# Patient Record
Sex: MALE | Race: WHITE | NOT HISPANIC OR LATINO | Employment: FULL TIME | URBAN - METROPOLITAN AREA
[De-identification: names, ages, dates, MRNs, and addresses within clinical notes are randomized per-mention and may not be internally consistent; named-entity substitution may affect disease eponyms.]

---

## 2019-01-08 ENCOUNTER — TELEPHONE (OUTPATIENT)
Dept: INTERNAL MEDICINE | Facility: CLINIC | Age: 39
End: 2019-01-08

## 2019-01-08 RX ORDER — LISINOPRIL 40 MG/1
40 TABLET ORAL DAILY
Qty: 30 TABLET | Refills: 1 | Status: SHIPPED | OUTPATIENT
Start: 2019-01-08 | End: 2019-03-12 | Stop reason: SDUPTHER

## 2019-01-14 ENCOUNTER — TELEPHONE (OUTPATIENT)
Dept: INTERNAL MEDICINE | Facility: CLINIC | Age: 39
End: 2019-01-14

## 2019-01-17 ENCOUNTER — TELEPHONE (OUTPATIENT)
Dept: INTERNAL MEDICINE | Facility: CLINIC | Age: 39
End: 2019-01-17

## 2019-01-17 NOTE — TELEPHONE ENCOUNTER
Pt returned call from 1/14 and stated he was going to try and see [facility] for the problem because its too hard to get down here from [place]. Stated if he couldn't get in with them he would return call and make an appt.

## 2019-03-12 RX ORDER — LISINOPRIL 40 MG/1
40 TABLET ORAL DAILY
Qty: 90 TABLET | Refills: 1 | Status: SHIPPED | OUTPATIENT
Start: 2019-03-12 | End: 2019-09-09 | Stop reason: SDUPTHER

## 2019-04-01 PROBLEM — E78.5 HYPERLIPIDEMIA LDL GOAL <100: Status: ACTIVE | Noted: 2019-04-01

## 2019-04-01 PROBLEM — M54.50 LOW BACK PAIN AT MULTIPLE SITES: Status: ACTIVE | Noted: 2019-04-01

## 2019-04-23 PROBLEM — K64.9 HEMORRHOIDS: Status: ACTIVE | Noted: 2019-04-23

## 2019-04-23 PROBLEM — I45.10 INCOMPLETE RIGHT BUNDLE BRANCH BLOCK (RBBB): Status: ACTIVE | Noted: 2019-04-23

## 2019-05-14 ENCOUNTER — OFFICE VISIT (OUTPATIENT)
Dept: INTERNAL MEDICINE | Facility: CLINIC | Age: 39
End: 2019-05-14

## 2019-05-14 ENCOUNTER — LAB REQUISITION (OUTPATIENT)
Dept: LAB | Facility: HOSPITAL | Age: 39
End: 2019-05-14

## 2019-05-14 VITALS
DIASTOLIC BLOOD PRESSURE: 86 MMHG | TEMPERATURE: 97.3 F | SYSTOLIC BLOOD PRESSURE: 110 MMHG | WEIGHT: 225 LBS | HEIGHT: 73 IN | BODY MASS INDEX: 29.82 KG/M2 | HEART RATE: 70 BPM

## 2019-05-14 DIAGNOSIS — Z00.00 PREVENTATIVE HEALTH CARE: ICD-10-CM

## 2019-05-14 DIAGNOSIS — Z23 NEED FOR HEPATITIS A VACCINATION: ICD-10-CM

## 2019-05-14 DIAGNOSIS — I10 ESSENTIAL HYPERTENSION: Primary | ICD-10-CM

## 2019-05-14 DIAGNOSIS — Z00.00 ROUTINE GENERAL MEDICAL EXAMINATION AT A HEALTH CARE FACILITY: ICD-10-CM

## 2019-05-14 DIAGNOSIS — E78.5 HYPERLIPIDEMIA LDL GOAL <100: ICD-10-CM

## 2019-05-14 PROCEDURE — 90471 IMMUNIZATION ADMIN: CPT | Performed by: INTERNAL MEDICINE

## 2019-05-14 PROCEDURE — 36415 COLL VENOUS BLD VENIPUNCTURE: CPT | Performed by: INTERNAL MEDICINE

## 2019-05-14 PROCEDURE — 99395 PREV VISIT EST AGE 18-39: CPT | Performed by: INTERNAL MEDICINE

## 2019-05-14 PROCEDURE — 90632 HEPA VACCINE ADULT IM: CPT | Performed by: INTERNAL MEDICINE

## 2019-05-14 NOTE — PROGRESS NOTES
"Chief Complaint   Patient presents with   • Annual Exam     fasting for labs       History of Present Illness  38 y.o. white male presents for wellness exam. He is doing well with heartburn with rare symptoms less than monthly. He denies chest pain.    Review of Systems   Constitutional: Negative for chills and fever.   HENT: Positive for postnasal drip.    Respiratory: Negative for cough and shortness of breath.    Cardiovascular: Negative for chest pain and palpitations.   Gastrointestinal: Negative for abdominal pain, nausea and vomiting.   Musculoskeletal: Positive for back pain (back pain improved ).   Skin: Negative for rash.   Neurological: Negative for dizziness, light-headedness and headaches.   All other systems reviewed and are negative.     All other ROS reviewed and negative.    PMSFH:  The following portions of the patient's history were reviewed and updated as appropriate: allergies, current medications, past family history, past medical history, past social history, past surgical history and problem list.      Current Outpatient Medications:   •  lisinopril (PRINIVIL,ZESTRIL) 40 MG tablet, Take 1 tablet by mouth Daily., Disp: 90 tablet, Rfl: 1  •  ranitidine (ZANTAC) 150 MG tablet, Take 150 mg by mouth daily., Disp: , Rfl:     VITALS:  /86   Pulse 70   Temp 97.3 °F (36.3 °C)   Ht 185.4 cm (72.99\")   Wt 102 kg (225 lb)   BMI 29.69 kg/m²     Physical Exam   Constitutional: He is oriented to person, place, and time. He appears well-developed and well-nourished.   HENT:   Head: Normocephalic.   Right Ear: External ear normal.   Left Ear: External ear normal.   Mouth/Throat: Oropharynx is clear and moist.   Eyes: Conjunctivae and EOM are normal.   Neck: No JVD present.   Cardiovascular: Normal rate, regular rhythm and normal heart sounds.   Pulmonary/Chest: Effort normal and breath sounds normal. No respiratory distress.   Abdominal: Soft. Bowel sounds are normal. There is no tenderness. "   Genitourinary:   Genitourinary Comments: No hernias   Musculoskeletal: He exhibits no edema.   Lymphadenopathy:     He has no cervical adenopathy.   Neurological: He is alert and oriented to person, place, and time.   Skin: Skin is warm and dry.   Psychiatric: He has a normal mood and affect. His behavior is normal.   Nursing note and vitals reviewed.      LABS:  No results found for this or any previous visit.    Procedures         ASSESSMENT/PLAN:  Problem List Items Addressed This Visit        Cardiovascular and Mediastinum    Essential hypertension - Primary     Hypertension is improving with treatment.  Continue current treatment regimen.  Dietary sodium restriction.  Weight loss.  Regular aerobic exercise.  Blood pressure will be reassessed at the next regular appointment.            Other    Preventative health care     He will get second Hep A vaccine. Age-appropriate Counseling:  Discussed preventative medicine issues with patient including regular exercise, healthy diet, stress reduction, adequate sleep and recommended age-appropriate screening studies.  Immunizations reviewed.                    FOLLOW-UP:  No Follow-up on file.      Electronically signed by:    Yoan Woodard MD

## 2019-05-14 NOTE — ASSESSMENT & PLAN NOTE
He will get second Hep A vaccine. Age-appropriate Counseling:  Discussed preventative medicine issues with patient including regular exercise, healthy diet, stress reduction, adequate sleep and recommended age-appropriate screening studies.  Immunizations reviewed.

## 2019-05-15 LAB
ALBUMIN SERPL-MCNC: 4.4 G/DL (ref 3.5–5.2)
ALBUMIN/CREAT UR: 3.7 MG/G CREAT (ref 0–30)
ALBUMIN/GLOB SERPL: 1.7 G/DL
ALP SERPL-CCNC: 69 U/L (ref 39–117)
ALT SERPL-CCNC: 31 U/L (ref 1–41)
AST SERPL-CCNC: 29 U/L (ref 1–40)
BILIRUB SERPL-MCNC: 0.6 MG/DL (ref 0.2–1.2)
BUN SERPL-MCNC: 10 MG/DL (ref 6–20)
BUN/CREAT SERPL: 10.5 (ref 7–25)
CALCIUM SERPL-MCNC: 10.1 MG/DL (ref 8.6–10.5)
CHLORIDE SERPL-SCNC: 99 MMOL/L (ref 98–107)
CHOLEST SERPL-MCNC: 219 MG/DL (ref 0–200)
CO2 SERPL-SCNC: 27.2 MMOL/L (ref 22–29)
CREAT SERPL-MCNC: 0.95 MG/DL (ref 0.76–1.27)
CREAT UR-MCNC: 113.5 MG/DL
GLOBULIN SER CALC-MCNC: 2.6 GM/DL
GLUCOSE SERPL-MCNC: 94 MG/DL (ref 65–99)
HDLC SERPL-MCNC: 44 MG/DL (ref 40–60)
LDLC SERPL CALC-MCNC: 148 MG/DL (ref 0–100)
MICROALBUMIN UR-MCNC: 4.2 UG/ML
POTASSIUM SERPL-SCNC: 4.2 MMOL/L (ref 3.5–5.2)
PROT SERPL-MCNC: 7 G/DL (ref 6–8.5)
SODIUM SERPL-SCNC: 140 MMOL/L (ref 136–145)
TRIGL SERPL-MCNC: 134 MG/DL (ref 0–150)
TSH SERPL DL<=0.005 MIU/L-ACNC: 1.09 MIU/ML (ref 0.27–4.2)
VLDLC SERPL CALC-MCNC: 26.8 MG/DL

## 2019-09-09 RX ORDER — LISINOPRIL 40 MG/1
40 TABLET ORAL DAILY
Qty: 90 TABLET | Refills: 1 | Status: SHIPPED | OUTPATIENT
Start: 2019-09-09 | End: 2020-01-17 | Stop reason: SDUPTHER

## 2020-01-17 RX ORDER — LISINOPRIL 40 MG/1
40 TABLET ORAL DAILY
Qty: 90 TABLET | Refills: 0 | Status: SHIPPED | OUTPATIENT
Start: 2020-01-17 | End: 2020-05-18

## 2020-05-18 RX ORDER — LISINOPRIL 40 MG/1
TABLET ORAL
Qty: 30 TABLET | Refills: 0 | Status: SHIPPED | OUTPATIENT
Start: 2020-05-18 | End: 2020-06-19

## 2020-05-26 ENCOUNTER — OFFICE VISIT (OUTPATIENT)
Dept: INTERNAL MEDICINE | Facility: CLINIC | Age: 40
End: 2020-05-26

## 2020-05-26 ENCOUNTER — LAB (OUTPATIENT)
Dept: LAB | Facility: HOSPITAL | Age: 40
End: 2020-05-26

## 2020-05-26 VITALS
DIASTOLIC BLOOD PRESSURE: 84 MMHG | TEMPERATURE: 98 F | HEIGHT: 73 IN | HEART RATE: 66 BPM | SYSTOLIC BLOOD PRESSURE: 100 MMHG | BODY MASS INDEX: 30.19 KG/M2 | OXYGEN SATURATION: 99 % | WEIGHT: 227.8 LBS

## 2020-05-26 DIAGNOSIS — E78.5 HYPERLIPIDEMIA LDL GOAL <100: ICD-10-CM

## 2020-05-26 DIAGNOSIS — M54.50 LOW BACK PAIN AT MULTIPLE SITES: ICD-10-CM

## 2020-05-26 DIAGNOSIS — I10 ESSENTIAL HYPERTENSION: ICD-10-CM

## 2020-05-26 DIAGNOSIS — Z00.00 PREVENTATIVE HEALTH CARE: Primary | ICD-10-CM

## 2020-05-26 DIAGNOSIS — M25.512 ACUTE PAIN OF LEFT SHOULDER: ICD-10-CM

## 2020-05-26 DIAGNOSIS — Z20.5 EXPOSURE TO HEPATITIS C: ICD-10-CM

## 2020-05-26 LAB
ALBUMIN SERPL-MCNC: 4.9 G/DL (ref 3.5–5.2)
ALBUMIN UR-MCNC: <1.2 MG/DL
ALBUMIN/GLOB SERPL: 2.2 G/DL
ALP SERPL-CCNC: 52 U/L (ref 39–117)
ALT SERPL W P-5'-P-CCNC: 33 U/L (ref 1–41)
ANION GAP SERPL CALCULATED.3IONS-SCNC: 12.2 MMOL/L (ref 5–15)
AST SERPL-CCNC: 61 U/L (ref 1–40)
BASOPHILS # BLD AUTO: 0.03 10*3/MM3 (ref 0–0.2)
BASOPHILS NFR BLD AUTO: 0.7 % (ref 0–1.5)
BILIRUB SERPL-MCNC: 0.7 MG/DL (ref 0.2–1.2)
BUN BLD-MCNC: 11 MG/DL (ref 6–20)
BUN/CREAT SERPL: 12.5 (ref 7–25)
CALCIUM SPEC-SCNC: 9.4 MG/DL (ref 8.6–10.5)
CHLORIDE SERPL-SCNC: 101 MMOL/L (ref 98–107)
CHOLEST SERPL-MCNC: 202 MG/DL (ref 0–200)
CO2 SERPL-SCNC: 25.8 MMOL/L (ref 22–29)
CREAT BLD-MCNC: 0.88 MG/DL (ref 0.76–1.27)
CREAT UR-MCNC: 67.6 MG/DL
CRP SERPL-MCNC: 0.14 MG/DL (ref 0.01–0.5)
DEPRECATED RDW RBC AUTO: 38.5 FL (ref 37–54)
EOSINOPHIL # BLD AUTO: 0.27 10*3/MM3 (ref 0–0.4)
EOSINOPHIL NFR BLD AUTO: 6.3 % (ref 0.3–6.2)
ERYTHROCYTE [DISTWIDTH] IN BLOOD BY AUTOMATED COUNT: 11.9 % (ref 12.3–15.4)
GFR SERPL CREATININE-BSD FRML MDRD: 97 ML/MIN/1.73
GLOBULIN UR ELPH-MCNC: 2.2 GM/DL
GLUCOSE BLD-MCNC: 99 MG/DL (ref 65–99)
HCT VFR BLD AUTO: 44.1 % (ref 37.5–51)
HCV AB SER DONR QL: NORMAL
HDLC SERPL-MCNC: 43 MG/DL (ref 40–60)
HGB BLD-MCNC: 15.1 G/DL (ref 13–17.7)
IMM GRANULOCYTES # BLD AUTO: 0.02 10*3/MM3 (ref 0–0.05)
IMM GRANULOCYTES NFR BLD AUTO: 0.5 % (ref 0–0.5)
LDLC SERPL CALC-MCNC: 132 MG/DL (ref 0–100)
LDLC/HDLC SERPL: 3.07 {RATIO}
LYMPHOCYTES # BLD AUTO: 1.5 10*3/MM3 (ref 0.7–3.1)
LYMPHOCYTES NFR BLD AUTO: 35 % (ref 19.6–45.3)
MCH RBC QN AUTO: 30.1 PG (ref 26.6–33)
MCHC RBC AUTO-ENTMCNC: 34.2 G/DL (ref 31.5–35.7)
MCV RBC AUTO: 88 FL (ref 79–97)
MICROALBUMIN/CREAT UR: NORMAL MG/G{CREAT}
MONOCYTES # BLD AUTO: 0.32 10*3/MM3 (ref 0.1–0.9)
MONOCYTES NFR BLD AUTO: 7.5 % (ref 5–12)
NEUTROPHILS # BLD AUTO: 2.14 10*3/MM3 (ref 1.7–7)
NEUTROPHILS NFR BLD AUTO: 50 % (ref 42.7–76)
NRBC BLD AUTO-RTO: 0 /100 WBC (ref 0–0.2)
PLATELET # BLD AUTO: 183 10*3/MM3 (ref 140–450)
PMV BLD AUTO: 11.3 FL (ref 6–12)
POTASSIUM BLD-SCNC: 4.3 MMOL/L (ref 3.5–5.2)
PROT SERPL-MCNC: 7.1 G/DL (ref 6–8.5)
RBC # BLD AUTO: 5.01 10*6/MM3 (ref 4.14–5.8)
SODIUM BLD-SCNC: 139 MMOL/L (ref 136–145)
TRIGL SERPL-MCNC: 136 MG/DL (ref 0–150)
TSH SERPL DL<=0.05 MIU/L-ACNC: 1.59 UIU/ML (ref 0.27–4.2)
VLDLC SERPL-MCNC: 27.2 MG/DL (ref 5–40)
WBC NRBC COR # BLD: 4.28 10*3/MM3 (ref 3.4–10.8)

## 2020-05-26 PROCEDURE — 82570 ASSAY OF URINE CREATININE: CPT

## 2020-05-26 PROCEDURE — 80053 COMPREHEN METABOLIC PANEL: CPT

## 2020-05-26 PROCEDURE — 86141 C-REACTIVE PROTEIN HS: CPT

## 2020-05-26 PROCEDURE — 82043 UR ALBUMIN QUANTITATIVE: CPT

## 2020-05-26 PROCEDURE — 85025 COMPLETE CBC W/AUTO DIFF WBC: CPT

## 2020-05-26 PROCEDURE — 86803 HEPATITIS C AB TEST: CPT

## 2020-05-26 PROCEDURE — 99395 PREV VISIT EST AGE 18-39: CPT | Performed by: INTERNAL MEDICINE

## 2020-05-26 PROCEDURE — 84443 ASSAY THYROID STIM HORMONE: CPT

## 2020-05-26 PROCEDURE — 80061 LIPID PANEL: CPT

## 2020-05-26 PROCEDURE — 93000 ELECTROCARDIOGRAM COMPLETE: CPT | Performed by: INTERNAL MEDICINE

## 2020-05-26 RX ORDER — FAMOTIDINE 20 MG/1
20 TABLET, FILM COATED ORAL DAILY
COMMUNITY

## 2020-05-26 RX ORDER — TIZANIDINE HYDROCHLORIDE 4 MG/1
4 CAPSULE, GELATIN COATED ORAL NIGHTLY PRN
Qty: 30 CAPSULE | Refills: 1 | Status: SHIPPED | OUTPATIENT
Start: 2020-05-26 | End: 2022-06-28

## 2020-05-26 NOTE — ASSESSMENT & PLAN NOTE
Hypertension is improving with treatment.  Continue current treatment regimen.  Regular aerobic exercise.  Blood pressure will be reassessed at the next regular appointment.

## 2020-05-26 NOTE — PROGRESS NOTES
"Chief Complaint   Patient presents with   • Hypertension     physical   • Pain     joints/muscular L shoulder , lower back and both knees       History of Present Illness  39 y.o. white male presents for wellness exam. Overall health good except some joint and muscle aches. He has some intermittent low back pain and left shoulder pain and left thigh pain.     Review of Systems   Constitutional: Negative for chills, fatigue and fever.   HENT: Negative for congestion, ear pain, sinus pressure and sore throat.    Respiratory: Negative for cough, chest tightness, shortness of breath and wheezing.    Cardiovascular: Negative for chest pain and palpitations.   Gastrointestinal: Negative for abdominal pain, blood in stool and constipation.   Musculoskeletal: Positive for arthralgias, back pain, joint swelling and myalgias.   Skin: Negative for color change.   Allergic/Immunologic: Negative for environmental allergies.   Neurological: Negative for dizziness, speech difficulty and headaches.   Psychiatric/Behavioral: Positive for sleep disturbance. Negative for confusion and dysphoric mood. The patient is not nervous/anxious.      All other ROS reviewed and negative.    PMSFH:  The following portions of the patient's history were reviewed and updated as appropriate: allergies, current medications, past family history, past medical history, past social history, past surgical history and problem list.      Current Outpatient Medications:   •  famotidine (PEPCID) 20 MG tablet, Take 20 mg by mouth Daily., Disp: , Rfl:   •  lisinopril (PRINIVIL,ZESTRIL) 40 MG tablet, TAKE ONE TABLET BY MOUTH DAILY, Disp: 30 tablet, Rfl: 0  •  TiZANidine (ZANAFLEX) 4 MG capsule, Take 1 capsule by mouth At Night As Needed for Muscle Spasms (back pain)., Disp: 30 capsule, Rfl: 1    VITALS:  /84 (BP Location: Left arm, Patient Position: Sitting, Cuff Size: Adult)   Pulse 66   Temp 98 °F (36.7 °C) (Temporal)   Ht 185.4 cm (73\") Comment: per " patient  Wt 103 kg (227 lb 12.8 oz)   SpO2 99%   BMI 30.05 kg/m²     Physical Exam   Constitutional: He is oriented to person, place, and time. He appears well-developed and well-nourished.   HENT:   Head: Normocephalic.   Right Ear: External ear normal.   Left Ear: External ear normal.   Eyes: Conjunctivae and EOM are normal.   Neck: No JVD present.   Cardiovascular: Normal rate and normal heart sounds.   bradycardia   Pulmonary/Chest: Effort normal and breath sounds normal. No respiratory distress.   Abdominal: Soft. Bowel sounds are normal. There is no tenderness.   Musculoskeletal: He exhibits tenderness (left shoulder pain).   Lymphadenopathy:     He has no cervical adenopathy.   Neurological: He is alert and oriented to person, place, and time.   Skin: Skin is warm and dry.   Psychiatric: He has a normal mood and affect. His behavior is normal.   Nursing note and vitals reviewed.      LABS:  Results for orders placed or performed in visit on 05/14/19   Microalbumin / Creatinine Urine Ratio -   Result Value Ref Range    Creatinine, Urine 113.5 Not Estab. mg/dL    Microalbumin, Urine 4.2 Not Estab. ug/mL    Microalbumin/Creatinine Ratio 3.7 0.0 - 30.0 mg/g creat   Comprehensive Metabolic Panel   Result Value Ref Range    Glucose 94 65 - 99 mg/dL    BUN 10 6 - 20 mg/dL    Creatinine 0.95 0.76 - 1.27 mg/dL    eGFR Non African Am 89 >60 mL/min/1.73    eGFR African Am 108 >60 mL/min/1.73    BUN/Creatinine Ratio 10.5 7.0 - 25.0    Sodium 140 136 - 145 mmol/L    Potassium 4.2 3.5 - 5.2 mmol/L    Chloride 99 98 - 107 mmol/L    Total CO2 27.2 22.0 - 29.0 mmol/L    Calcium 10.1 8.6 - 10.5 mg/dL    Total Protein 7.0 6.0 - 8.5 g/dL    Albumin 4.40 3.50 - 5.20 g/dL    Globulin 2.6 gm/dL    A/G Ratio 1.7 g/dL    Total Bilirubin 0.6 0.2 - 1.2 mg/dL    Alkaline Phosphatase 69 39 - 117 U/L    AST (SGOT) 29 1 - 40 U/L    ALT (SGPT) 31 1 - 41 U/L   Lipid Panel   Result Value Ref Range    Total Cholesterol 219 (H) 0 - 200 mg/dL     Triglycerides 134 0 - 150 mg/dL    HDL Cholesterol 44 40 - 60 mg/dL    VLDL Cholesterol 26.8 mg/dL    LDL Cholesterol  148 (H) 0 - 100 mg/dL   TSH Rfx On Abnormal To Free T4   Result Value Ref Range    TSH 1.090 0.270 - 4.200 mIU/mL         ECG 12 Lead  Date/Time: 5/26/2020 11:33 AM  Performed by: Yoan Woodard MD  Authorized by: Yoan Woodard MD   Comparison: compared with previous ECG from 9/16/2016  Similar to previous ECG  Rhythm: sinus bradycardia  Rate: bradycardic  Conduction: incomplete right bundle branch block  QRS axis: normal  Comments: Overall ok EKG                 ASSESSMENT/PLAN:  Problem List Items Addressed This Visit        Cardiovascular and Mediastinum    Essential hypertension     Hypertension is improving with treatment.  Continue current treatment regimen.  Regular aerobic exercise.  Blood pressure will be reassessed at the next regular appointment.         Relevant Orders    Microalbumin / Creatinine Urine Ratio - Urine, Clean Catch    Hyperlipidemia LDL goal <100     Lipid abnormalities are improving with lifestyle modifications.  Nutritional counseling was provided.  Lipids will be reassessed in 1 year.         Relevant Orders    Comprehensive Metabolic Panel    Lipid Panel    TSH Rfx On Abnormal To Free T4    High Sensitivity CRP    CBC & Differential       Nervous and Auditory    Low back pain at multiple sites     Worsening and some left thigh pain. See Dr Trujillo.          Relevant Medications    TiZANidine (ZANAFLEX) 4 MG capsule    Other Relevant Orders    Ambulatory Referral to Physical Medicine Rehab       Other    Preventative health care - Primary     His mood is good and overall good with medications. Check labs. Age-appropriate Counseling:  Discussed preventative medicine issues with patient including regular exercise, healthy diet, stress reduction, adequate sleep and recommended age-appropriate screening studies.  Immunizations reviewed.                Other Visit  Diagnoses     Acute pain of left shoulder        See Dr Trujillo.     Relevant Orders    Ambulatory Referral to Physical Medicine Rehab    Exposure to hepatitis C        Relevant Orders    Hepatitis C Antibody          FOLLOW-UP:  Return in about 1 year (around 5/26/2021) for Labs this visit, Annual physical.      Electronically signed by:    Yoan Woodard MD

## 2020-05-26 NOTE — ASSESSMENT & PLAN NOTE
Lipid abnormalities are improving with lifestyle modifications.  Nutritional counseling was provided.  Lipids will be reassessed in 1 year.

## 2020-05-26 NOTE — ASSESSMENT & PLAN NOTE
His mood is good and overall good with medications. Check labs. Age-appropriate Counseling:  Discussed preventative medicine issues with patient including regular exercise, healthy diet, stress reduction, adequate sleep and recommended age-appropriate screening studies.  Immunizations reviewed.

## 2020-06-19 RX ORDER — LISINOPRIL 40 MG/1
TABLET ORAL
Qty: 30 TABLET | Refills: 0 | Status: SHIPPED | OUTPATIENT
Start: 2020-06-19 | End: 2020-08-17

## 2020-08-17 RX ORDER — LISINOPRIL 40 MG/1
TABLET ORAL
Qty: 90 TABLET | Refills: 1 | Status: SHIPPED | OUTPATIENT
Start: 2020-08-17 | End: 2021-02-11 | Stop reason: SDUPTHER

## 2020-08-21 ENCOUNTER — PATIENT MESSAGE (OUTPATIENT)
Dept: INTERNAL MEDICINE | Facility: CLINIC | Age: 40
End: 2020-08-21

## 2020-08-21 NOTE — TELEPHONE ENCOUNTER
From: Maldonado Escobedo  To: Yoan Woodard MD  Sent: 8/21/2020 12:42 PM EDT  Subject: Prescription Question    Could I please have more than one refill of the Lisinopril!! I've been in recently and the medication dosage gas not changed. Thank you

## 2020-09-01 ENCOUNTER — TREATMENT (OUTPATIENT)
Dept: PHYSICAL THERAPY | Facility: CLINIC | Age: 40
End: 2020-09-01

## 2020-09-01 DIAGNOSIS — G89.29 CHRONIC LOW BACK PAIN, UNSPECIFIED BACK PAIN LATERALITY, UNSPECIFIED WHETHER SCIATICA PRESENT: ICD-10-CM

## 2020-09-01 DIAGNOSIS — M25.559 PAIN, HIP: ICD-10-CM

## 2020-09-01 DIAGNOSIS — M54.2 CERVICAL PAIN: Primary | ICD-10-CM

## 2020-09-01 DIAGNOSIS — M54.50 CHRONIC LOW BACK PAIN, UNSPECIFIED BACK PAIN LATERALITY, UNSPECIFIED WHETHER SCIATICA PRESENT: ICD-10-CM

## 2020-09-01 PROCEDURE — 97110 THERAPEUTIC EXERCISES: CPT | Performed by: PHYSICAL THERAPIST

## 2020-09-01 PROCEDURE — 97162 PT EVAL MOD COMPLEX 30 MIN: CPT | Performed by: PHYSICAL THERAPIST

## 2020-09-01 NOTE — PROGRESS NOTES
Physical Therapy Initial Evaluation and Plan of Care    Patient: Maldonado Escobedo   : 1980  Diagnosis/ICD-10 Code:  Cervical pain [M54.2]  Referring practitioner: Alejandra Trujillo MD  Date of Initial Visit: 2020  Today's Date: 2020  Patient seen for 1 sessions           Subjective Questionnaire: Oswestry:  = 14 % disability      Subjective Evaluation    History of Present Illness  Mechanism of injury: Patient reports no apparent reason for neck and LBP, work catching up with him.    right now Neck Pain 2/10. LBP right side when shifting in seat 2/10. At rest 0/10 LBP but does feel it in neck and shoulders. At night when sleeping on Left side and elbow bent left hand goes numb. Rotates a lot when he sleeps. Some nights better than others.   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  BP worse during standing, during long surgeries.  At rest only R side LBP, but after long standing up to 7 or 8/10.  Hunching over doing sx.  Not running anymore. But still walking  And sometimes running short distances to get somewhere.  What helps the most is putting a heating pad on it every night. Flat on back under back and neck.   Sometimes walking longer has ref. R hip to anterior thigh pain. Occasionally it does go down the shin. Has not done that for about 6 months.    Subjective comment: 38 y/o w/m c/o neck and LBP for 1 to 2 years. Seeking [facility] per MD recomendation.   Patient Occupation: [employer], looking down, surgeries, charts Pain  Current pain ratin (base of neck and going into shoulder blades. )  At best pain ratin (back pain can be 0/10, neck always at least 2/10. )  At worst pain ratin (at end of day after long standing doing surgeries. )  Relieving factors: heat and rest (lying flat on back )  Aggravating factors: standing (standing sitting huched over)  Progression: worsening    Hand dominance: right    Patient Goals  Patient goals for  therapy: decreased pain, increased motion, increased strength, independence with ADLs/IADLs and return to sport/leisure activities  Patient goal: Be able to go to work and not hurt at the end of the day, learn to self manage.            Objective          Postural Observations  Seated posture: fair  Standing posture: fair  Correction of posture: makes symptoms better (sitting with lumbar roll)        Active Range of Motion   Cervical/Thoracic Spine   Cervical  Subcranial protraction: with pain   Subcranial retraction: WFL   Flexion: 28 (worse base of neck pain during) degrees with pain  Extension: 80 (no effect) degrees   Left lateral flexion: 30 (no effect) degrees   Right lateral flexion: 35 (no effect) degrees   Left rotation: 80 (no effect) degrees   Right rotation: 62 (worse R side of neck into shoulder pain during) degrees with pain    Thoracic   Extension: 80 (no effect) degrees     Lumbar   Flexion: 80 (catch group home down and back up) degrees with pain  Extension: 35 (better during, back to before after. ) degrees with pain  Left lateral flexion: 28 (feel more like a stretch in R LB during) degrees   Right lateral flexion: 27 (worse R LBP during) degrees with pain    Additional Active Range of Motion Details  MMT both UE and LE 5/5   Shoulders AROM WNL but pop in left shoulder at end ROM flexion.  Normal sensation both UE/LE  SLR (-)  Improved AROM standing flexion after repeat EIS, less LBP during standing flexion and less pain during side glides after.  Able to tolerate PPU, LBP better after.  After repeat neck retractions improved neck rotation with less pain to the R.  Able to tolerate progression to retraction ext/rot  Do these for HEP every 2 hours.  Patient downloaded [employer] marcell.             Assessment & Plan     Assessment  Impairments: abnormal or restricted ROM, activity intolerance, lacks appropriate home exercise program and pain with function  Assessment details: 40 y/o w/m c/o chronis neck  and LBP with ref. R leg pain getting worse. At eval responding to posture correction and neck retractions and lumbar extensions. Patient will benefit from skilled PT.   Barriers to therapy: Hx HTN  Prognosis: good  Functional Limitations: lifting, sleeping, walking, pulling, uncomfortable because of pain, moving in bed, sitting, standing and stooping  Goals  Plan Goals: Pt presents to PT with symptoms consistent with chronic neck and LBP. Pt would benefit from skilled PT intervention to address the deficits noted.      SHORT TERM GOALS: Time for goal achievement:  3 weeks  1. Pt will be able to demonstrate initial HEP.  2. Pt reports  Neck &LBP at least 50% better.  3. Pt can demo safe body mechanics.  4. Pt can tolerate core strength ex.    LONG TERM GOALS: Time for goal achievement: 3 months  1. Pt to score significantly less on Modified Oswestry score.  2. Pt reports he is ready for DC to [facility] for self management of his condition.   3. Pt has full pain free AROM lumbar in standing.   4. Patient has full pain free neck AROM.     Plan  Therapy options: will be seen for skilled physical therapy services  Planned modality interventions: cryotherapy, electrical stimulation/Russian stimulation, TENS, thermotherapy (hydrocollator packs), traction and ultrasound  Planned therapy interventions: abdominal trunk stabilization, body mechanics training, gait training, home exercise program, manual therapy, neuromuscular re-education, soft tissue mobilization, spinal/joint mobilization, strengthening, stretching and therapeutic activities  Frequency: 1x week  Duration in visits: 10  Duration in weeks: 10  Treatment plan discussed with: patient        Timed:         Manual Therapy:         mins  33122;     Therapeutic Exercise:    15     mins  32405;     Neuromuscular Lois:        mins  90991;    Therapeutic Activity:          mins  63670;     Gait Training:           mins  59927;     Ultrasound:          mins   64724;    Ionto                                   mins   81372  Self Care                            mins   64096  Aquatic                               mins 18923      Un-Timed:  Electrical Stimulation:         mins  75177 ( );  Dry Needling          mins self-pay  Traction          mins 30166  Low Eval          Mins  33434  Mod Eval     30     Mins  03445  High Eval                            Mins  89582  Re-Eval                               mins  44753        Timed Treatment:   15   mins   Total Treatment:     45   mins    PT SIGNATURE: Yasmin Whitney, PT   DATE TREATMENT INITIATED: 9/1/2020    Initial Certification  Certification Period: 11/30/2020  I certify that the therapy services are furnished while this patient is under my care.  The services outlined above are required by this patient, and will be reviewed every 90 days.     PHYSICIAN: Alejandra Trujillo MD      DATE:     Please sign and return via fax to [phone].. Thank you, [facility].

## 2020-09-08 ENCOUNTER — TREATMENT (OUTPATIENT)
Dept: PHYSICAL THERAPY | Facility: CLINIC | Age: 40
End: 2020-09-08

## 2020-09-08 DIAGNOSIS — M54.50 CHRONIC LOW BACK PAIN, UNSPECIFIED BACK PAIN LATERALITY, UNSPECIFIED WHETHER SCIATICA PRESENT: ICD-10-CM

## 2020-09-08 DIAGNOSIS — M54.2 CERVICAL PAIN: Primary | ICD-10-CM

## 2020-09-08 DIAGNOSIS — G89.29 CHRONIC LOW BACK PAIN, UNSPECIFIED BACK PAIN LATERALITY, UNSPECIFIED WHETHER SCIATICA PRESENT: ICD-10-CM

## 2020-09-08 PROCEDURE — 97110 THERAPEUTIC EXERCISES: CPT | Performed by: PHYSICAL THERAPIST

## 2020-09-08 PROCEDURE — 97140 MANUAL THERAPY 1/> REGIONS: CPT | Performed by: PHYSICAL THERAPIST

## 2020-09-08 PROCEDURE — 97035 APP MDLTY 1+ULTRASOUND EA 15: CPT | Performed by: PHYSICAL THERAPIST

## 2020-09-08 NOTE — PROGRESS NOTES
Physical Therapy Daily Progress Note  VISIT#: 2 POC 10    Subjective   Maldonado Escobedo reports: has been sitting better with lumbar roll, has been doing neck retractions. Does better during the day but still waking up with 7/10 left neck UT pain. No LBP now or this weekend. Has been trying to sleep with different pillows, not successful. Not able to sleep on his back.    Objective     See Exercise, Manual, and Modality Logs for complete treatment.     Patient Education: practiced HEP.   Practiced: neck retractions with self OP.  Improved AROM rotation and extension after repeat retractions and ext/rot's. No more pain in mid range, still pain at end ROM up to 6/10.     Adding Thoracic extensions to HEP.  Still pain in one little spot during end ROM PPU. Stretch feels good.  Progressed to PPU with SAG.      Assessment/Plan    No LBP at rest, only catch at end ROM extension during PPU.   Still Left side neck pain after session but better, only 6/10 at end ROM rotation and extension.   Progress per Plan of Care and Progress strengthening /stabilization /functional activity            Timed:         Manual Therapy:    10     mins  18227;     Therapeutic Exercise:    25     mins  97220;     Neuromuscular Lois:        mins  27589;    Therapeutic Activity:          mins  17001;     Gait Training:           mins  32048;     Ultrasound:     10     mins  60193;    Ionto                                   mins   17529  Self Care                            mins   65866  Canalith Repos                   mins  4209  Aquatic                               mins 05729    Un-Timed:  Electrical Stimulation:         mins  21348 ( );  Dry Needling          mins self-pay  Traction          mins 23611    Timed Treatment:   45   mins   Total Treatment:     45   mins    Yasmin Whitney PT  [place] License # [id number]  Physical Therapist

## 2020-09-15 ENCOUNTER — TREATMENT (OUTPATIENT)
Dept: PHYSICAL THERAPY | Facility: CLINIC | Age: 40
End: 2020-09-15

## 2020-09-15 DIAGNOSIS — G89.29 CHRONIC LOW BACK PAIN, UNSPECIFIED BACK PAIN LATERALITY, UNSPECIFIED WHETHER SCIATICA PRESENT: ICD-10-CM

## 2020-09-15 DIAGNOSIS — M54.2 CERVICAL PAIN: Primary | ICD-10-CM

## 2020-09-15 DIAGNOSIS — M54.50 CHRONIC LOW BACK PAIN, UNSPECIFIED BACK PAIN LATERALITY, UNSPECIFIED WHETHER SCIATICA PRESENT: ICD-10-CM

## 2020-09-15 PROCEDURE — 97110 THERAPEUTIC EXERCISES: CPT | Performed by: PHYSICAL THERAPIST

## 2020-09-15 PROCEDURE — 97140 MANUAL THERAPY 1/> REGIONS: CPT | Performed by: PHYSICAL THERAPIST

## 2020-09-15 NOTE — PROGRESS NOTES
Physical Therapy Daily Progress Note  VISIT#: 3 POC 10    Subjective     Maldonado Escobedo reports: overall 30% better. Right now 5/10 Left neck pain and no LBP. Monday for first time woke up with out neck pain. Went walking 2 mile this am did not heave LBP of Anterior R hip pain. Stopped using Temperpedic pillow. Now down to one smaller pillow.   Objective       See Exercise, Manual, and Modality Logs for complete treatment.     Patient Education: Discussed/practiced back and side sleeping with pillows staggered and towel roll in base of pillow. Discussed trying body pillow for other arm for side sleeping. Practiced HEP.   Practiced: neck retractions with self OP.  Improved neck  AROM rotation and extension after repeat retractions and ext/rot's. Levator stretch and STM, Occiput release. Left UT pain down to 2/10  Adding Levator stretches to HEP.    Assessment/Plan  Came in with no LBP, left with less Left neck pain. L SCM much more tight than R.  Had 2 releases with thoracic mobs, still increased thoracic kyphosis,       Progress per Plan of Care and Progress strengthening /stabilization /functional activity            Timed:         Manual Therapy:    15     mins  30317;     Therapeutic Exercise:    30     mins  75630;     Neuromuscular Lois:        mins  86222;    Therapeutic Activity:          mins  84837;     Gait Training:           mins  67336;     Ultrasound:          mins  01921;    Ionto                                   mins   22562  Self Care                            mins   68582  Canalith Repos                   mins  4209  Aquatic                               mins 05034    Un-Timed:  Electrical Stimulation:         mins  62530 ( );  Dry Needling          mins self-pay  Traction          mins 60315    Timed Treatment:   45   mins   Total Treatment:     45   mins    Yasmin Whitney PT  [place] License # [id number]  Physical Therapist

## 2020-10-06 ENCOUNTER — TREATMENT (OUTPATIENT)
Dept: PHYSICAL THERAPY | Facility: CLINIC | Age: 40
End: 2020-10-06

## 2020-10-06 DIAGNOSIS — M54.2 CERVICAL PAIN: Primary | ICD-10-CM

## 2020-10-06 DIAGNOSIS — M54.50 CHRONIC LOW BACK PAIN, UNSPECIFIED BACK PAIN LATERALITY, UNSPECIFIED WHETHER SCIATICA PRESENT: ICD-10-CM

## 2020-10-06 DIAGNOSIS — G89.29 CHRONIC LOW BACK PAIN, UNSPECIFIED BACK PAIN LATERALITY, UNSPECIFIED WHETHER SCIATICA PRESENT: ICD-10-CM

## 2020-10-06 PROCEDURE — 97110 THERAPEUTIC EXERCISES: CPT | Performed by: PHYSICAL THERAPIST

## 2020-10-06 NOTE — PROGRESS NOTES
Physical Therapy Daily Progress Note  VISIT#: 4 POC 10    Subjective     Maldonado Escobedo reports: overall better, less often neck and LBP. Sleeping better. Made adjustment where he can. Feeling more tightness up higher in [place]iput recently. Only R hip hurting when going for a walk after work now, No longer LBP. Still LBP when standing longer at work.   Objective       See Exercise, Manual, and Modality Logs for complete treatment.     Patient Education:  Practiced: neck retractions with self OP.  Improved neck  AROM rotation to end ROM.      Assessment/Plan  Came in with no LBP, left with less Left neck pain, after neck ex, upper core strength with TB and stretches.    SHORT TERM GOALS: Time for goal achievement:  3 weeks  1. Pt will be able to demonstrate initial HEP - met  2. Pt reports  Neck &LBP at least 50% better - making progress  3. Pt can demo safe body mechanics.  4. Pt can tolerate core strength ex. - met    LONG TERM GOALS: Time for goal achievement: 3 months  1. Pt to score significantly less on Modified Oswestry score.  2. Pt reports he is ready for DC to [facility] for self management of his condition.   3. Pt has full pain free AROM lumbar in standing - making progress  4. Patient has full pain free neck AROM - making progress    Progress per Plan of Care and Progress strengthening /stabilization /functional activity            Timed:         Manual Therapy:         mins  88691;     Therapeutic Exercise:    40     mins  74554;     Neuromuscular Lois:        mins  60389;    Therapeutic Activity:          mins  82298;     Gait Training:           mins  35546;     Ultrasound:          mins  19970;    Ionto                                   mins   19370  Self Care                            mins   15797  Canalith Repos                   mins  4209  Aquatic                               mins 10840    Un-Timed:  Electrical Stimulation:         mins  57797 ( );  Dry Needling          mins  self-pay  Traction          mins 86905    Timed Treatment:   40   mins   Total Treatment:     40   mins    Yasmin Whitney, PT  [place] License # [id number]  Physical Therapist

## 2020-10-13 ENCOUNTER — TREATMENT (OUTPATIENT)
Dept: PHYSICAL THERAPY | Facility: CLINIC | Age: 40
End: 2020-10-13

## 2020-10-13 DIAGNOSIS — M25.559 PAIN, HIP: ICD-10-CM

## 2020-10-13 DIAGNOSIS — M54.2 CERVICAL PAIN: Primary | ICD-10-CM

## 2020-10-13 DIAGNOSIS — M54.50 CHRONIC LOW BACK PAIN, UNSPECIFIED BACK PAIN LATERALITY, UNSPECIFIED WHETHER SCIATICA PRESENT: ICD-10-CM

## 2020-10-13 DIAGNOSIS — G89.29 CHRONIC LOW BACK PAIN, UNSPECIFIED BACK PAIN LATERALITY, UNSPECIFIED WHETHER SCIATICA PRESENT: ICD-10-CM

## 2020-10-13 PROCEDURE — 97110 THERAPEUTIC EXERCISES: CPT | Performed by: PHYSICAL THERAPIST

## 2020-10-13 NOTE — PROGRESS NOTES
Physical Therapy Daily Progress Note  VISIT#: 5 POC 10    Subjective     Maldonado Escobedo reports: overall better,  Sleeping better. Mainly tightness after doing TB ex in neck. Only LBP when sleeping funny.        Objective       See Exercise, Manual, and Modality Logs for complete treatment.     Patient Education:  Practiced: neck retractions with self OP, also R 45 rotation for lingering tension in R base of neck /UT.  Full neck  AROM rotation to end ROM.      Assessment/Plan  Came in with no LBP, left with less Right neck strain, after neck ex, upper core strength with TB and stretches.  Added lumbar flex/LTR and Piriformis stretches to HEP.    SHORT TERM GOALS: Time for goal achievement:  3 weeks  1. Pt will be able to demonstrate initial HEP - met  2. Pt reports  Neck &LBP at least 50% better - making progress  3. Pt can demo safe body mechanics.  4. Pt can tolerate core strength ex. - met    LONG TERM GOALS: Time for goal achievement: 3 months  1. Pt to score significantly less on Modified Oswestry score.  2. Pt reports he is ready for DC to [facility] for self management of his condition.   3. Pt has full pain free AROM lumbar in standing - making progress  4. Patient has full pain free neck AROM - making progress    Progress per Plan of Care and Progress strengthening /stabilization /functional activity            Timed:         Manual Therapy:         mins  91892;     Therapeutic Exercise:    45     mins  88322;     Neuromuscular Lois:        mins  07245;    Therapeutic Activity:          mins  40488;     Gait Training:           mins  00987;     Ultrasound:          mins  32791;    Ionto                                   mins   94931  Self Care                            mins   07724  Canalith Repos                   mins  4209  Aquatic                               mins 76078    Un-Timed:  Electrical Stimulation:         mins  65723 ( );  Dry Needling          mins self-pay  Traction           mins 12828    Timed Treatment:   45   mins   Total Treatment:     45   mins    Yasmin Whitney PT  [place] License # [id number]  Physical Therapist

## 2020-10-20 ENCOUNTER — TREATMENT (OUTPATIENT)
Dept: PHYSICAL THERAPY | Facility: CLINIC | Age: 40
End: 2020-10-20

## 2020-10-20 DIAGNOSIS — M54.50 CHRONIC LOW BACK PAIN, UNSPECIFIED BACK PAIN LATERALITY, UNSPECIFIED WHETHER SCIATICA PRESENT: ICD-10-CM

## 2020-10-20 DIAGNOSIS — M54.2 CERVICAL PAIN: Primary | ICD-10-CM

## 2020-10-20 DIAGNOSIS — M25.559 PAIN, HIP: ICD-10-CM

## 2020-10-20 DIAGNOSIS — G89.29 CHRONIC LOW BACK PAIN, UNSPECIFIED BACK PAIN LATERALITY, UNSPECIFIED WHETHER SCIATICA PRESENT: ICD-10-CM

## 2020-10-20 PROCEDURE — 97110 THERAPEUTIC EXERCISES: CPT | Performed by: PHYSICAL THERAPIST

## 2020-10-20 NOTE — PROGRESS NOTES
Physical Therapy Daily Progress Note  VISIT#: 6 POC 10    Subjective     Maldonado Escobedo reports: came in with stiff neck from sleeping. Still had R anterior hip pain when he was walking last week after PT session.     Objective       See Exercise, Manual, and Modality Logs for complete treatment.     Patient Education:  Practiced HEP, progressed to CAROLIN neck sustained neck retraction and sitting lumbar flexion.    Assessment/Plan  Came in with no LBP, left with less Right neck strain  Did trial Toe in R hip flex stretch to address lingering R hip pain during end ROM flexion and walking.    SHORT TERM GOALS: Time for goal achievement:  3 weeks  1. Pt will be able to demonstrate initial HEP - met  2. Pt reports  Neck &LBP at least 50% better - making progress  3. Pt can demo safe body mechanics.  4. Pt can tolerate core strength ex. - met    LONG TERM GOALS: Time for goal achievement: 3 months  1. Pt to score significantly less on Modified Oswestry score.  2. Pt reports he is ready for DC to [facility] for self management of his condition.   3. Pt has full pain free AROM lumbar in standing - making progress  4. Patient has full pain free neck AROM - making progress    Progress per Plan of Care and Progress strengthening /stabilization /functional activity            Timed:         Manual Therapy:         mins  08803;     Therapeutic Exercise:    45     mins  95386;     Neuromuscular Lois:        mins  72928;    Therapeutic Activity:          mins  22561;     Gait Training:           mins  18845;     Ultrasound:          mins  02202;    Ionto                                   mins   13719  Self Care                            mins   08162  Canalith Repos                   mins  4209  Aquatic                               mins 22420    Un-Timed:  Electrical Stimulation:         mins  30430 ( );  Dry Needling          mins self-pay  Traction          mins 14143    Timed Treatment:   45   mins   Total Treatment:      45   mins    Yasmin Whitney, PT  [place] License # [id number]  Physical Therapist

## 2020-10-27 ENCOUNTER — TREATMENT (OUTPATIENT)
Dept: PHYSICAL THERAPY | Facility: CLINIC | Age: 40
End: 2020-10-27

## 2020-10-27 DIAGNOSIS — M54.2 CERVICAL PAIN: Primary | ICD-10-CM

## 2020-10-27 DIAGNOSIS — M54.50 CHRONIC LOW BACK PAIN, UNSPECIFIED BACK PAIN LATERALITY, UNSPECIFIED WHETHER SCIATICA PRESENT: ICD-10-CM

## 2020-10-27 DIAGNOSIS — M25.559 PAIN, HIP: ICD-10-CM

## 2020-10-27 DIAGNOSIS — G89.29 CHRONIC LOW BACK PAIN, UNSPECIFIED BACK PAIN LATERALITY, UNSPECIFIED WHETHER SCIATICA PRESENT: ICD-10-CM

## 2020-10-27 PROCEDURE — 97110 THERAPEUTIC EXERCISES: CPT | Performed by: PHYSICAL THERAPIST

## 2020-10-27 PROCEDURE — 97035 APP MDLTY 1+ULTRASOUND EA 15: CPT | Performed by: PHYSICAL THERAPIST

## 2020-10-27 PROCEDURE — 97140 MANUAL THERAPY 1/> REGIONS: CPT | Performed by: PHYSICAL THERAPIST

## 2020-10-27 NOTE — PROGRESS NOTES
Physical Therapy Daily Progress Note  VISIT#: 7 POC 10    Subjective     Maldonado Escobedo reports: came in with L side of neck stiff from sleeping on it wrong. R anterior hip pain doing better when walking after stretching.    Objective       See Exercise, Manual, and Modality Logs for complete treatment.     Patient Education:  Practiced some of neck HEP after neck US and back/neck STM with Myofascial release.    Assessment/Plan  Full pain free neck AROM after US and STM.   Did some stretches and upper core strength on Swiss ball.Finished with cane shoulder flexion stretches.    SHORT TERM GOALS: Time for goal achievement:  3 weeks  1. Pt will be able to demonstrate initial HEP - met  2. Pt reports  Neck &LBP at least 50% better - making progress  3. Pt can demo safe body mechanics.  4. Pt can tolerate core strength ex. - met    LONG TERM GOALS: Time for goal achievement: 3 months  1. Pt to score significantly less on Modified Oswestry score.  2. Pt reports he is ready for DC to [facility] for self management of his condition.   3. Pt has full pain free AROM lumbar in standing - making progress  4. Patient has full pain free neck AROM - making progress    Progress per Plan of Care and Progress strengthening /stabilization /functional activity            Timed:         Manual Therapy:    20     mins  24673;     Therapeutic Exercise:    18     mins  46419;     Neuromuscular Lois:        mins  03801;    Therapeutic Activity:          mins  76853;     Gait Training:           mins  96888;     Ultrasound:     10     mins  68443;    Ionto                                   mins   26350  Self Care                            mins   81402  Canalith Repos                   mins  4209  Aquatic                               mins 12614    Un-Timed:  Electrical Stimulation:         mins  95787 ( );  Dry Needling          mins self-pay  Traction          mins 74364    Timed Treatment:   48   mins   Total Treatment:     48    earl Whitney, PT  [place] License # [id number]  Physical Therapist

## 2020-12-22 ENCOUNTER — DOCUMENTATION (OUTPATIENT)
Dept: PHYSICAL THERAPY | Facility: CLINIC | Age: 40
End: 2020-12-22

## 2020-12-22 NOTE — PROGRESS NOTES
Discharge Summary  Discharge Summary from Physical Therapy Report      Maldonado Olsen  82  Number of Visits: 7         Goals: Partially Met    Discharge Plan: Continue with current home exercise program as instructed    Comments: patient has scheduling conflict at this time. Will be DC and may resume PT with new MD order in the new Year.    Date of Discharge: 20        Yasmin Whitney, PT  Physical Therapist

## 2021-02-11 RX ORDER — LISINOPRIL 40 MG/1
40 TABLET ORAL DAILY
Qty: 90 TABLET | Refills: 1 | Status: SHIPPED | OUTPATIENT
Start: 2021-02-11 | End: 2021-08-12

## 2021-04-16 ENCOUNTER — BULK ORDERING (OUTPATIENT)
Dept: CASE MANAGEMENT | Facility: OTHER | Age: 41
End: 2021-04-16

## 2021-04-16 DIAGNOSIS — Z23 IMMUNIZATION DUE: ICD-10-CM

## 2021-04-28 ENCOUNTER — TELEPHONE (OUTPATIENT)
Dept: INTERNAL MEDICINE | Facility: CLINIC | Age: 41
End: 2021-04-28

## 2021-04-28 DIAGNOSIS — G89.29 CHRONIC MIDLINE LOW BACK PAIN WITHOUT SCIATICA: Primary | ICD-10-CM

## 2021-04-28 DIAGNOSIS — M54.50 CHRONIC MIDLINE LOW BACK PAIN WITHOUT SCIATICA: Primary | ICD-10-CM

## 2021-04-28 DIAGNOSIS — M54.2 NECK PAIN: ICD-10-CM

## 2021-04-28 NOTE — TELEPHONE ENCOUNTER
----- Message from Maldonado Escobedo sent at 4/28/2021 11:28 AM EDT -----  Regarding: Referral Request  Contact: [phone]  I was wondering if Dr. Woodard could ok another referral for physical therapy for me.  The previous , pt was fine but far away and difficult to get to regularly.  There is a [facility] 5 min from my house, [address] [place] [address], [place], [place].  Please call me if there are any problems, [phone].  My lower back and neck are giving me issues again from my work, sleep, etc.   Thank You,    Ze Escobedo

## 2021-04-28 NOTE — TELEPHONE ENCOUNTER
Ok I will put in order and document at his physical in June make sure he follows through on appt in 1 month and get order to him or PT

## 2021-06-01 ENCOUNTER — OFFICE VISIT (OUTPATIENT)
Dept: INTERNAL MEDICINE | Facility: CLINIC | Age: 41
End: 2021-06-01

## 2021-06-01 VITALS
TEMPERATURE: 98 F | HEIGHT: 73 IN | HEART RATE: 63 BPM | SYSTOLIC BLOOD PRESSURE: 124 MMHG | WEIGHT: 223 LBS | DIASTOLIC BLOOD PRESSURE: 82 MMHG | BODY MASS INDEX: 29.55 KG/M2

## 2021-06-01 DIAGNOSIS — E78.5 HYPERLIPIDEMIA LDL GOAL <100: ICD-10-CM

## 2021-06-01 DIAGNOSIS — I10 ESSENTIAL HYPERTENSION: ICD-10-CM

## 2021-06-01 DIAGNOSIS — Z13.21 ENCOUNTER FOR VITAMIN DEFICIENCY SCREENING: ICD-10-CM

## 2021-06-01 DIAGNOSIS — Z00.00 PREVENTATIVE HEALTH CARE: Primary | ICD-10-CM

## 2021-06-01 PROCEDURE — 93000 ELECTROCARDIOGRAM COMPLETE: CPT | Performed by: INTERNAL MEDICINE

## 2021-06-01 PROCEDURE — 99395 PREV VISIT EST AGE 18-39: CPT | Performed by: INTERNAL MEDICINE

## 2021-06-01 NOTE — ASSESSMENT & PLAN NOTE
Overall mood is good. Proceed with labs. Get yearly eye exams. Age-appropriate Counseling:  Discussed preventative medicine issues with patient including regular exercise, healthy diet, stress reduction, adequate sleep and recommended age-appropriate screening studies.  Immunizations reviewed.

## 2021-06-01 NOTE — PROGRESS NOTES
"Chief Complaint   Patient presents with   • Annual Exam     not fasting       History of Present Illness  40 y.o. white male presents for wellness exam.     Review of Systems   Constitutional: Negative for chills and fever.   HENT: Negative for sore throat.    Respiratory: Negative for cough and shortness of breath.    Cardiovascular: Negative for chest pain and palpitations.   Gastrointestinal: Negative for abdominal pain, nausea and vomiting.   Musculoskeletal: Negative for back pain.   Skin: Negative for rash.   Neurological: Negative for dizziness, light-headedness and headaches.   Hematological: Negative for adenopathy.   Psychiatric/Behavioral: Negative for dysphoric mood and sleep disturbance.   All other systems reviewed and are negative.    .    PMSFH:  The following portions of the patient's history were reviewed and updated as appropriate: allergies, current medications, past family history, past medical history, past social history, past surgical history and problem list.      Current Outpatient Medications:   •  famotidine (PEPCID) 20 MG tablet, Take 20 mg by mouth Daily., Disp: , Rfl:   •  lisinopril (PRINIVIL,ZESTRIL) 40 MG tablet, Take 1 tablet by mouth Daily., Disp: 90 tablet, Rfl: 1  •  TiZANidine (ZANAFLEX) 4 MG capsule, Take 1 capsule by mouth At Night As Needed for Muscle Spasms (back pain)., Disp: 30 capsule, Rfl: 1    VITALS:  /82   Pulse 63   Temp 98 °F (36.7 °C)   Ht 185.4 cm (72.99\")   Wt 101 kg (223 lb)   BMI 29.43 kg/m²     Physical Exam  Vitals and nursing note reviewed.   Constitutional:       Appearance: Normal appearance. He is well-developed.   HENT:      Head: Normocephalic.      Right Ear: Tympanic membrane and ear canal normal.      Left Ear: Tympanic membrane and ear canal normal.   Eyes:      Extraocular Movements: Extraocular movements intact.      Conjunctiva/sclera: Conjunctivae normal.   Cardiovascular:      Rate and Rhythm: Normal rate and regular rhythm.      " Heart sounds: Normal heart sounds.   Pulmonary:      Effort: Pulmonary effort is normal. No respiratory distress.      Breath sounds: Normal breath sounds.   Abdominal:      General: Bowel sounds are normal.      Palpations: Abdomen is soft.      Tenderness: There is no abdominal tenderness.   Genitourinary:     Testes: Normal.   Skin:     General: Skin is warm and dry.   Neurological:      General: No focal deficit present.      Mental Status: He is alert and oriented to person, place, and time.   Psychiatric:         Mood and Affect: Mood normal.         Behavior: Behavior normal.         LABS:  Results for orders placed or performed in visit on 05/26/20   Hepatitis C Antibody    Specimen: Blood   Result Value Ref Range    Hepatitis C Ab Non-Reactive Non-Reactive   Comprehensive Metabolic Panel    Specimen: Blood   Result Value Ref Range    Glucose 99 65 - 99 mg/dL    BUN 11 6 - 20 mg/dL    Creatinine 0.88 0.76 - 1.27 mg/dL    Sodium 139 136 - 145 mmol/L    Potassium 4.3 3.5 - 5.2 mmol/L    Chloride 101 98 - 107 mmol/L    CO2 25.8 22.0 - 29.0 mmol/L    Calcium 9.4 8.6 - 10.5 mg/dL    Total Protein 7.1 6.0 - 8.5 g/dL    Albumin 4.90 3.50 - 5.20 g/dL    ALT (SGPT) 33 1 - 41 U/L    AST (SGOT) 61 (H) 1 - 40 U/L    Alkaline Phosphatase 52 39 - 117 U/L    Total Bilirubin 0.7 0.2 - 1.2 mg/dL    eGFR Non African Amer 97 >60 mL/min/1.73    Globulin 2.2 gm/dL    A/G Ratio 2.2 g/dL    BUN/Creatinine Ratio 12.5 7.0 - 25.0    Anion Gap 12.2 5.0 - 15.0 mmol/L   Lipid Panel    Specimen: Blood   Result Value Ref Range    Total Cholesterol 202 (H) 0 - 200 mg/dL    Triglycerides 136 0 - 150 mg/dL    HDL Cholesterol 43 40 - 60 mg/dL    LDL Cholesterol  132 (H) 0 - 100 mg/dL    VLDL Cholesterol 27.2 5 - 40 mg/dL    LDL/HDL Ratio 3.07    Microalbumin / Creatinine Urine Ratio - Urine, Clean Catch    Specimen: Urine, Clean Catch   Result Value Ref Range    Microalbumin/Creatinine Ratio      Creatinine, Urine 67.6 mg/dL    Microalbumin,  Urine <1.2 mg/dL   TSH Rfx On Abnormal To Free T4    Specimen: Blood   Result Value Ref Range    TSH 1.590 0.270 - 4.200 uIU/mL   High Sensitivity CRP    Specimen: Blood   Result Value Ref Range    C-Reactive Protein 0.139 0.010 - 0.500 mg/dL   CBC Auto Differential    Specimen: Blood   Result Value Ref Range    WBC 4.28 3.40 - 10.80 10*3/mm3    RBC 5.01 4.14 - 5.80 10*6/mm3    Hemoglobin 15.1 13.0 - 17.7 g/dL    Hematocrit 44.1 37.5 - 51.0 %    MCV 88.0 79.0 - 97.0 fL    MCH 30.1 26.6 - 33.0 pg    MCHC 34.2 31.5 - 35.7 g/dL    RDW 11.9 (L) 12.3 - 15.4 %    RDW-SD 38.5 37.0 - 54.0 fl    MPV 11.3 6.0 - 12.0 fL    Platelets 183 140 - 450 10*3/mm3    Neutrophil % 50.0 42.7 - 76.0 %    Lymphocyte % 35.0 19.6 - 45.3 %    Monocyte % 7.5 5.0 - 12.0 %    Eosinophil % 6.3 (H) 0.3 - 6.2 %    Basophil % 0.7 0.0 - 1.5 %    Immature Grans % 0.5 0.0 - 0.5 %    Neutrophils, Absolute 2.14 1.70 - 7.00 10*3/mm3    Lymphocytes, Absolute 1.50 0.70 - 3.10 10*3/mm3    Monocytes, Absolute 0.32 0.10 - 0.90 10*3/mm3    Eosinophils, Absolute 0.27 0.00 - 0.40 10*3/mm3    Basophils, Absolute 0.03 0.00 - 0.20 10*3/mm3    Immature Grans, Absolute 0.02 0.00 - 0.05 10*3/mm3    nRBC 0.0 0.0 - 0.2 /100 WBC         ECG 12 Lead    Date/Time: 6/1/2021 12:37 PM  Performed by: Yoan Woodard MD  Authorized by: Yoan Woodard MD   Comparison: compared with previous ECG from 5/26/2020  Similar to previous ECG  Rhythm: sinus bradycardia  Rate: bradycardic  Conduction: incomplete right bundle branch block  QRS axis: normal                   ASSESSMENT/PLAN:  Problems Addressed this Visit        Cardiac and Vasculature    Essential hypertension     Hypertension is improving with treatment.  Continue current treatment regimen.  Weight loss.  Regular aerobic exercise.  Blood pressure will be reassessed at the next regular appointment.         Relevant Orders    Microalbumin / Creatinine Urine Ratio - Urine, Clean Catch    Hyperlipidemia LDL goal <100      Lipid abnormalities are improving with lifestyle modifications.  Nutritional counseling was provided.  Lipids will be reassessed 12months.         Relevant Orders    Lipid Panel    Comprehensive Metabolic Panel    TSH Rfx On Abnormal To Free T4    High Sensitivity CRP       Health Encounters    Preventative health care - Primary     Overall mood is good. Proceed with labs. Get yearly eye exams. Age-appropriate Counseling:  Discussed preventative medicine issues with patient including regular exercise, healthy diet, stress reduction, adequate sleep and recommended age-appropriate screening studies.  Immunizations reviewed.                Other Visit Diagnoses     Encounter for vitamin deficiency screening        Relevant Orders    Vitamin D 25 Hydroxy      Diagnoses       Codes Comments    Preventative health care    -  Primary ICD-10-CM: Z00.00  ICD-9-CM: V70.0     Hyperlipidemia LDL goal <100     ICD-10-CM: E78.5  ICD-9-CM: 272.4     Essential hypertension     ICD-10-CM: I10  ICD-9-CM: 401.9     Encounter for vitamin deficiency screening     ICD-10-CM: Z13.21  ICD-9-CM: V77.99           FOLLOW-UP:  Return for Annual physical.      Electronically signed by:    Yoan Woodard MD

## 2021-06-01 NOTE — ASSESSMENT & PLAN NOTE
Lipid abnormalities are improving with lifestyle modifications.  Nutritional counseling was provided.  Lipids will be reassessed 12months.

## 2021-08-12 RX ORDER — LISINOPRIL 40 MG/1
TABLET ORAL
Qty: 90 TABLET | Refills: 1 | Status: SHIPPED | OUTPATIENT
Start: 2021-08-12 | End: 2022-02-09

## 2022-02-09 RX ORDER — LISINOPRIL 40 MG/1
TABLET ORAL
Qty: 30 TABLET | Refills: 5 | Status: SHIPPED | OUTPATIENT
Start: 2022-02-09 | End: 2022-08-17 | Stop reason: SDUPTHER

## 2022-02-09 NOTE — TELEPHONE ENCOUNTER
Rx Refill Note  Requested Prescriptions     Pending Prescriptions Disp Refills   • lisinopril (PRINIVIL,ZESTRIL) 40 MG tablet [Pharmacy Med Name: LISINOPRIL 40 MG TABLET] 30 tablet      Sig: TAKE ONE TABLET BY MOUTH DAILY      Last office visit with prescribing clinician: 6/1/21    Next office visit with prescribing clinician: 6/14/22           Gosia Turcios RN  02/09/22, 10:29 EST

## 2022-06-13 ENCOUNTER — TELEPHONE (OUTPATIENT)
Dept: INTERNAL MEDICINE | Facility: CLINIC | Age: 42
End: 2022-06-13

## 2022-06-13 ENCOUNTER — TELEMEDICINE (OUTPATIENT)
Dept: INTERNAL MEDICINE | Facility: CLINIC | Age: 42
End: 2022-06-13

## 2022-06-13 DIAGNOSIS — I10 ESSENTIAL HYPERTENSION: ICD-10-CM

## 2022-06-13 DIAGNOSIS — U07.1 COVID-19 VIRUS DETECTED: Primary | ICD-10-CM

## 2022-06-13 PROCEDURE — 99213 OFFICE O/P EST LOW 20 MIN: CPT | Performed by: INTERNAL MEDICINE

## 2022-06-13 NOTE — PROGRESS NOTES
Chief Complaint   Patient presents with   • Covid-19 Home Monitoring Video Visit     This was an audio and video enabled telemedicine encounter.this was a doximity video visit carried out due to him having covid and he voiced consent   History of Present Illness  41 y.o. male presents for evaluation of COVID. He developed symptoms over the weekend but is feeling some better today and no shortness of breath and the coughing and congestion are improving     Review of Systems   Constitutional: Positive for fatigue.   HENT: Positive for postnasal drip.    Respiratory: Positive for cough. Negative for shortness of breath.      .    PMSFH:  The following portions of the patient's history were reviewed and updated as appropriate: allergies, current medications, past family history, past medical history, past social history, past surgical history and problem list.      Current Outpatient Medications:   •  famotidine (PEPCID) 20 MG tablet, Take 20 mg by mouth Daily., Disp: , Rfl:   •  lisinopril (PRINIVIL,ZESTRIL) 40 MG tablet, TAKE ONE TABLET BY MOUTH DAILY, Disp: 30 tablet, Rfl: 5  •  TiZANidine (ZANAFLEX) 4 MG capsule, Take 1 capsule by mouth At Night As Needed for Muscle Spasms (back pain)., Disp: 30 capsule, Rfl: 1    VITALS:  There were no vitals taken for this visit.    Physical Exam  Constitutional:       Appearance: Normal appearance.   Pulmonary:      Effort: No respiratory distress.   Neurological:      General: No focal deficit present.      Mental Status: He is alert.   Psychiatric:         Mood and Affect: Mood normal.         Behavior: Behavior normal.         LABS:      Procedures         ASSESSMENT/PLAN:  Problems Addressed this Visit        Cardiac and Vasculature    Essential hypertension     Hypertension is unchanged.  Continue current treatment regimen.  Blood pressure will be reassessed in 2 weeks I explained that he could take COVID with having HTN but he is improving and declined. .              Other     COVID-19 virus detected - Primary     He is improving and we went over starting paxlovid but he declined for now.              Diagnoses       Codes Comments    COVID-19 virus detected    -  Primary ICD-10-CM: U07.1  ICD-9-CM: 079.89     Essential hypertension     ICD-10-CM: I10  ICD-9-CM: 401.9           FOLLOW-UP:  No follow-ups on file.      Electronically signed by:    Yoan Woodard MD

## 2022-06-13 NOTE — TELEPHONE ENCOUNTER
Caller: Maldonado Escobedo    Relationship to patient: Self    Best call back number: [phone]    Date of positive COVID19 test: 06/11/2022    COVID19 symptoms:   BODY ACHES, FEVER AND CONGESTION     Date of initial quarantine: 06/11/2022    Additional information or concerns:   PATIENT WOULD LIKE FOR MEDICATION TO BE CALLED INTO THE PHARMACY FOR HIS COVID SYMPTOMS     What is the patients preferred pharmacy:   [address] & ([place]) - [phone] [facility] [phone]   [phone]

## 2022-06-14 NOTE — ASSESSMENT & PLAN NOTE
Hypertension is unchanged.  Continue current treatment regimen.  Blood pressure will be reassessed in 2 weeks I explained that he could take COVID with having HTN but he is improving and declined. .

## 2022-06-28 ENCOUNTER — LAB (OUTPATIENT)
Dept: LAB | Facility: HOSPITAL | Age: 42
End: 2022-06-28

## 2022-06-28 ENCOUNTER — OFFICE VISIT (OUTPATIENT)
Dept: INTERNAL MEDICINE | Facility: CLINIC | Age: 42
End: 2022-06-28

## 2022-06-28 VITALS
DIASTOLIC BLOOD PRESSURE: 82 MMHG | BODY MASS INDEX: 29.95 KG/M2 | TEMPERATURE: 97.8 F | SYSTOLIC BLOOD PRESSURE: 122 MMHG | WEIGHT: 226 LBS | HEIGHT: 73 IN | OXYGEN SATURATION: 96 % | HEART RATE: 70 BPM

## 2022-06-28 DIAGNOSIS — E78.5 HYPERLIPIDEMIA LDL GOAL <100: ICD-10-CM

## 2022-06-28 DIAGNOSIS — Z00.00 PREVENTATIVE HEALTH CARE: Primary | ICD-10-CM

## 2022-06-28 DIAGNOSIS — K21.00 GASTROESOPHAGEAL REFLUX DISEASE WITH ESOPHAGITIS WITHOUT HEMORRHAGE: ICD-10-CM

## 2022-06-28 DIAGNOSIS — I10 ESSENTIAL HYPERTENSION: ICD-10-CM

## 2022-06-28 DIAGNOSIS — Z12.5 SCREENING PSA (PROSTATE SPECIFIC ANTIGEN): ICD-10-CM

## 2022-06-28 LAB
ALBUMIN SERPL-MCNC: 5 G/DL (ref 3.5–5.2)
ALBUMIN UR-MCNC: <1.2 MG/DL
ALBUMIN/GLOB SERPL: 1.9 G/DL
ALP SERPL-CCNC: 80 U/L (ref 39–117)
ALT SERPL W P-5'-P-CCNC: 26 U/L (ref 1–41)
ANION GAP SERPL CALCULATED.3IONS-SCNC: 15.2 MMOL/L (ref 5–15)
AST SERPL-CCNC: 26 U/L (ref 1–40)
BASOPHILS # BLD AUTO: 0.04 10*3/MM3 (ref 0–0.2)
BASOPHILS NFR BLD AUTO: 0.8 % (ref 0–1.5)
BILIRUB SERPL-MCNC: 0.5 MG/DL (ref 0–1.2)
BUN SERPL-MCNC: 8 MG/DL (ref 6–20)
BUN/CREAT SERPL: 9.2 (ref 7–25)
CALCIUM SPEC-SCNC: 10.4 MG/DL (ref 8.6–10.5)
CHLORIDE SERPL-SCNC: 98 MMOL/L (ref 98–107)
CHOLEST SERPL-MCNC: 208 MG/DL (ref 0–200)
CO2 SERPL-SCNC: 26.8 MMOL/L (ref 22–29)
CREAT SERPL-MCNC: 0.87 MG/DL (ref 0.76–1.27)
CREAT UR-MCNC: 89.6 MG/DL
DEPRECATED RDW RBC AUTO: 39.3 FL (ref 37–54)
EGFRCR SERPLBLD CKD-EPI 2021: 111.9 ML/MIN/1.73
EOSINOPHIL # BLD AUTO: 0.18 10*3/MM3 (ref 0–0.4)
EOSINOPHIL NFR BLD AUTO: 3.4 % (ref 0.3–6.2)
ERYTHROCYTE [DISTWIDTH] IN BLOOD BY AUTOMATED COUNT: 14.6 % (ref 12.3–15.4)
GLOBULIN UR ELPH-MCNC: 2.6 GM/DL
GLUCOSE SERPL-MCNC: 82 MG/DL (ref 65–99)
HCT VFR BLD AUTO: 40.3 % (ref 37.5–51)
HDLC SERPL-MCNC: 39 MG/DL (ref 40–60)
HGB BLD-MCNC: 13.7 G/DL (ref 13–17.7)
IMM GRANULOCYTES # BLD AUTO: 0.02 10*3/MM3 (ref 0–0.05)
IMM GRANULOCYTES NFR BLD AUTO: 0.4 % (ref 0–0.5)
LDLC SERPL CALC-MCNC: 141 MG/DL (ref 0–100)
LDLC/HDLC SERPL: 3.54 {RATIO}
LYMPHOCYTES # BLD AUTO: 1.7 10*3/MM3 (ref 0.7–3.1)
LYMPHOCYTES NFR BLD AUTO: 32 % (ref 19.6–45.3)
MCH RBC QN AUTO: 26 PG (ref 26.6–33)
MCHC RBC AUTO-ENTMCNC: 34 G/DL (ref 31.5–35.7)
MCV RBC AUTO: 76.6 FL (ref 79–97)
MICROALBUMIN/CREAT UR: NORMAL MG/G{CREAT}
MONOCYTES # BLD AUTO: 0.39 10*3/MM3 (ref 0.1–0.9)
MONOCYTES NFR BLD AUTO: 7.3 % (ref 5–12)
NEUTROPHILS NFR BLD AUTO: 2.99 10*3/MM3 (ref 1.7–7)
NEUTROPHILS NFR BLD AUTO: 56.1 % (ref 42.7–76)
NRBC BLD AUTO-RTO: 0 /100 WBC (ref 0–0.2)
PLATELET # BLD AUTO: 225 10*3/MM3 (ref 140–450)
PMV BLD AUTO: 11.2 FL (ref 6–12)
POTASSIUM SERPL-SCNC: 4.2 MMOL/L (ref 3.5–5.2)
PROT SERPL-MCNC: 7.6 G/DL (ref 6–8.5)
PSA SERPL-MCNC: 0.49 NG/ML (ref 0–4)
RBC # BLD AUTO: 5.26 10*6/MM3 (ref 4.14–5.8)
SODIUM SERPL-SCNC: 140 MMOL/L (ref 136–145)
TRIGL SERPL-MCNC: 154 MG/DL (ref 0–150)
TSH SERPL DL<=0.05 MIU/L-ACNC: 1.03 UIU/ML (ref 0.27–4.2)
VLDLC SERPL-MCNC: 28 MG/DL (ref 5–40)
WBC NRBC COR # BLD: 5.32 10*3/MM3 (ref 3.4–10.8)

## 2022-06-28 PROCEDURE — 93000 ELECTROCARDIOGRAM COMPLETE: CPT | Performed by: INTERNAL MEDICINE

## 2022-06-28 PROCEDURE — 82043 UR ALBUMIN QUANTITATIVE: CPT | Performed by: INTERNAL MEDICINE

## 2022-06-28 PROCEDURE — G0103 PSA SCREENING: HCPCS | Performed by: INTERNAL MEDICINE

## 2022-06-28 PROCEDURE — 85025 COMPLETE CBC W/AUTO DIFF WBC: CPT | Performed by: INTERNAL MEDICINE

## 2022-06-28 PROCEDURE — 84443 ASSAY THYROID STIM HORMONE: CPT | Performed by: INTERNAL MEDICINE

## 2022-06-28 PROCEDURE — 80061 LIPID PANEL: CPT | Performed by: INTERNAL MEDICINE

## 2022-06-28 PROCEDURE — 82570 ASSAY OF URINE CREATININE: CPT | Performed by: INTERNAL MEDICINE

## 2022-06-28 PROCEDURE — 80053 COMPREHEN METABOLIC PANEL: CPT | Performed by: INTERNAL MEDICINE

## 2022-06-28 PROCEDURE — 99396 PREV VISIT EST AGE 40-64: CPT | Performed by: INTERNAL MEDICINE

## 2022-06-28 NOTE — ASSESSMENT & PLAN NOTE
His mood is good overall. He declined TDAP. Proceed with labs. Age-appropriate Counseling:  Discussed preventative medicine issues with patient including regular exercise, healthy diet, stress reduction, adequate sleep and recommended age-appropriate screening studies.  Immunizations reviewed.

## 2022-06-28 NOTE — PROGRESS NOTES
"Chief Complaint   Patient presents with   • Hypertension   • Hyperlipidemia   • Annual Exam     With labs       History of Present Illness  41 y.o. male presents for wellness exam.     Review of Systems   Constitutional: Negative for chills and fever.   Respiratory: Negative for cough and shortness of breath.    Cardiovascular: Negative for chest pain and palpitations.   Gastrointestinal: Negative for abdominal pain, nausea and vomiting.   Skin: Negative for rash.   Neurological: Negative for dizziness, light-headedness and headaches.   Psychiatric/Behavioral: Negative for decreased concentration and dysphoric mood.   All other systems reviewed and are negative.    .    PMSFH:  The following portions of the patient's history were reviewed and updated as appropriate: allergies, current medications, past family history, past medical history, past social history, past surgical history and problem list.      Current Outpatient Medications:   •  famotidine (PEPCID) 20 MG tablet, Take 20 mg by mouth Daily., Disp: , Rfl:   •  lisinopril (PRINIVIL,ZESTRIL) 40 MG tablet, TAKE ONE TABLET BY MOUTH DAILY, Disp: 30 tablet, Rfl: 5    VITALS:  /82 (BP Location: Right arm, Patient Position: Sitting, Cuff Size: Adult)   Pulse 70   Temp 97.8 °F (36.6 °C) (Infrared)   Ht 185.4 cm (72.99\")   Wt 103 kg (226 lb)   SpO2 96%   BMI 29.83 kg/m²     Physical Exam  Vitals and nursing note reviewed.   Constitutional:       Appearance: Normal appearance. He is well-developed.   HENT:      Head: Normocephalic.      Right Ear: Tympanic membrane and ear canal normal.      Left Ear: Tympanic membrane and ear canal normal.   Eyes:      Extraocular Movements: Extraocular movements intact.      Conjunctiva/sclera: Conjunctivae normal.   Cardiovascular:      Rate and Rhythm: Normal rate and regular rhythm.      Heart sounds: Normal heart sounds.   Pulmonary:      Effort: Pulmonary effort is normal. No respiratory distress.      Breath " sounds: Normal breath sounds.   Abdominal:      General: Bowel sounds are normal.      Palpations: Abdomen is soft.      Tenderness: There is no abdominal tenderness.   Genitourinary:     Testes: Normal.   Skin:     General: Skin is warm and dry.   Neurological:      General: No focal deficit present.      Mental Status: He is alert and oriented to person, place, and time.   Psychiatric:         Mood and Affect: Mood normal.         Behavior: Behavior normal.         LABS:        ECG 12 Lead    Date/Time: 6/28/2022 3:36 PM  Performed by: Yoan Woodard MD  Authorized by: Yoan Woodard MD   Comparison: compared with previous ECG from 6/1/2021  Similar to previous ECG  Rhythm: sinus bradycardia  Rate: bradycardic  Conduction: incomplete right bundle branch block                 ASSESSMENT/PLAN:  Problems Addressed this Visit        Cardiac and Vasculature    Essential hypertension     Hypertension is improving with treatment.  Continue current treatment regimen.  Regular aerobic exercise.  Blood pressure will be reassessed at the next regular appointment.           Relevant Orders    Microalbumin / Creatinine Urine Ratio - Urine, Clean Catch    Hyperlipidemia LDL goal <100     Lipid abnormalities are improving with lifestyle modifications.  Nutritional counseling was provided.  Lipids will be reassessed in 1 year.           Relevant Orders    CBC & Differential    Comprehensive Metabolic Panel    Lipid Panel    TSH Rfx On Abnormal To Free T4       Gastrointestinal Abdominal     Reflux esophagitis     Doing well with daily pepcid              Health Encounters    Preventative health care - Primary     His mood is good overall. He declined TDAP. Proceed with labs. Age-appropriate Counseling:  Discussed preventative medicine issues with patient including regular exercise, healthy diet, stress reduction, adequate sleep and recommended age-appropriate screening studies.  Immunizations reviewed.                   Other Visit Diagnoses     Screening PSA (prostate specific antigen)        Relevant Orders    PSA Screen      Diagnoses       Codes Comments    Preventative health care    -  Primary ICD-10-CM: Z00.00  ICD-9-CM: V70.0     Hyperlipidemia LDL goal <100     ICD-10-CM: E78.5  ICD-9-CM: 272.4     Essential hypertension     ICD-10-CM: I10  ICD-9-CM: 401.9     Gastroesophageal reflux disease with esophagitis without hemorrhage     ICD-10-CM: K21.00  ICD-9-CM: 530.81, 530.10     Screening PSA (prostate specific antigen)     ICD-10-CM: Z12.5  ICD-9-CM: V76.44           FOLLOW-UP:  Return for Annual physical, Labs this visit.      Electronically signed by:    Yoan Woodard MD

## 2022-08-17 RX ORDER — LISINOPRIL 40 MG/1
40 TABLET ORAL DAILY
Qty: 30 TABLET | Refills: 5 | Status: SHIPPED | OUTPATIENT
Start: 2022-08-17 | End: 2023-02-19

## 2023-02-19 RX ORDER — LISINOPRIL 40 MG/1
TABLET ORAL
Qty: 30 TABLET | Refills: 5 | Status: SHIPPED | OUTPATIENT
Start: 2023-02-19

## 2023-07-11 PROBLEM — M79.671 RIGHT FOOT PAIN: Status: ACTIVE | Noted: 2023-07-11

## 2023-07-12 PROBLEM — M10.071 ACUTE IDIOPATHIC GOUT INVOLVING TOE OF RIGHT FOOT: Status: ACTIVE | Noted: 2023-07-12

## 2024-04-15 ENCOUNTER — PATIENT MESSAGE (OUTPATIENT)
Dept: INTERNAL MEDICINE | Facility: CLINIC | Age: 44
End: 2024-04-15
Payer: COMMERCIAL

## 2024-04-15 NOTE — TELEPHONE ENCOUNTER
From: Maldonado Escobedo  To: Yoan Woodard  Sent: 4/15/2024 11:12 AM EDT  Subject: Growth/lesion on head    I’ve felt a sore area in the top of my head for the last few days. The area is tender and more raised than appears in the picture. It feels almost like a mole would feel, very sensitive. It is not overtly itchy.     I didn’t know if there is something I should apply to it or if seeing a dermatologist is recommended. Thanks for your help.

## 2024-07-09 ENCOUNTER — PATIENT MESSAGE (OUTPATIENT)
Dept: INTERNAL MEDICINE | Facility: CLINIC | Age: 44
End: 2024-07-09
Payer: COMMERCIAL

## 2024-07-09 DIAGNOSIS — I10 ESSENTIAL HYPERTENSION: Primary | ICD-10-CM

## 2024-07-09 DIAGNOSIS — M10.071 ACUTE IDIOPATHIC GOUT INVOLVING TOE OF RIGHT FOOT: ICD-10-CM

## 2024-07-09 DIAGNOSIS — Z12.5 SCREENING PSA (PROSTATE SPECIFIC ANTIGEN): ICD-10-CM

## 2024-07-09 DIAGNOSIS — I45.10 INCOMPLETE RIGHT BUNDLE BRANCH BLOCK (RBBB): ICD-10-CM

## 2024-07-09 DIAGNOSIS — E78.5 HYPERLIPIDEMIA LDL GOAL <100: ICD-10-CM

## 2024-07-10 NOTE — TELEPHONE ENCOUNTER
From: Maldonado Escobedo  To: Yoan Woodard  Sent: 7/9/2024 9:34 PM EDT  Subject: Bloodwork on 7/12/24    I was wondering if I can go to diagnostics and do my bloodwork before my appointment at 10:45 on Friday? Thank you  
normal...

## 2024-07-12 ENCOUNTER — OFFICE VISIT (OUTPATIENT)
Dept: INTERNAL MEDICINE | Facility: CLINIC | Age: 44
End: 2024-07-12
Payer: COMMERCIAL

## 2024-07-12 ENCOUNTER — LAB (OUTPATIENT)
Dept: LAB | Facility: HOSPITAL | Age: 44
End: 2024-07-12
Payer: COMMERCIAL

## 2024-07-12 VITALS
HEART RATE: 78 BPM | HEIGHT: 73 IN | DIASTOLIC BLOOD PRESSURE: 78 MMHG | TEMPERATURE: 97.1 F | WEIGHT: 230 LBS | BODY MASS INDEX: 30.48 KG/M2 | SYSTOLIC BLOOD PRESSURE: 104 MMHG

## 2024-07-12 DIAGNOSIS — I10 ESSENTIAL HYPERTENSION: ICD-10-CM

## 2024-07-12 DIAGNOSIS — M10.071 ACUTE IDIOPATHIC GOUT INVOLVING TOE OF RIGHT FOOT: ICD-10-CM

## 2024-07-12 DIAGNOSIS — I45.10 INCOMPLETE RIGHT BUNDLE BRANCH BLOCK (RBBB): ICD-10-CM

## 2024-07-12 DIAGNOSIS — Z12.5 SCREENING PSA (PROSTATE SPECIFIC ANTIGEN): ICD-10-CM

## 2024-07-12 DIAGNOSIS — E78.5 HYPERLIPIDEMIA LDL GOAL <100: ICD-10-CM

## 2024-07-12 DIAGNOSIS — Z00.00 PREVENTATIVE HEALTH CARE: Primary | ICD-10-CM

## 2024-07-12 DIAGNOSIS — K21.00 GASTROESOPHAGEAL REFLUX DISEASE WITH ESOPHAGITIS WITHOUT HEMORRHAGE: ICD-10-CM

## 2024-07-12 LAB
ALBUMIN SERPL-MCNC: 4.5 G/DL (ref 3.5–5.2)
ALBUMIN UR-MCNC: <1.2 MG/DL
ALBUMIN/GLOB SERPL: 2 G/DL
ALP SERPL-CCNC: 62 U/L (ref 39–117)
ALT SERPL W P-5'-P-CCNC: 26 U/L (ref 1–41)
ANION GAP SERPL CALCULATED.3IONS-SCNC: 10.1 MMOL/L (ref 5–15)
AST SERPL-CCNC: 29 U/L (ref 1–40)
BASOPHILS # BLD AUTO: 0.03 10*3/MM3 (ref 0–0.2)
BASOPHILS NFR BLD AUTO: 0.8 % (ref 0–1.5)
BILIRUB SERPL-MCNC: 0.4 MG/DL (ref 0–1.2)
BUN SERPL-MCNC: 11 MG/DL (ref 6–20)
BUN/CREAT SERPL: 10.9 (ref 7–25)
CALCIUM SPEC-SCNC: 9.7 MG/DL (ref 8.6–10.5)
CHLORIDE SERPL-SCNC: 104 MMOL/L (ref 98–107)
CHOLEST SERPL-MCNC: 218 MG/DL (ref 0–200)
CO2 SERPL-SCNC: 22.9 MMOL/L (ref 22–29)
CREAT SERPL-MCNC: 1.01 MG/DL (ref 0.76–1.27)
CREAT UR-MCNC: 141.7 MG/DL
DEPRECATED RDW RBC AUTO: 43.5 FL (ref 37–54)
EGFRCR SERPLBLD CKD-EPI 2021: 95.2 ML/MIN/1.73
EOSINOPHIL # BLD AUTO: 0.27 10*3/MM3 (ref 0–0.4)
EOSINOPHIL NFR BLD AUTO: 6.8 % (ref 0.3–6.2)
ERYTHROCYTE [DISTWIDTH] IN BLOOD BY AUTOMATED COUNT: 16.2 % (ref 12.3–15.4)
GLOBULIN UR ELPH-MCNC: 2.3 GM/DL
GLUCOSE SERPL-MCNC: 103 MG/DL (ref 65–99)
HCT VFR BLD AUTO: 38.9 % (ref 37.5–51)
HCYS SERPL-MCNC: 11.3 UMOL/L (ref 0–15)
HDLC SERPL-MCNC: 37 MG/DL (ref 40–60)
HGB BLD-MCNC: 12.3 G/DL (ref 13–17.7)
IMM GRANULOCYTES # BLD AUTO: 0 10*3/MM3 (ref 0–0.05)
IMM GRANULOCYTES NFR BLD AUTO: 0 % (ref 0–0.5)
LDLC SERPL CALC-MCNC: 163 MG/DL (ref 0–100)
LDLC/HDLC SERPL: 4.37 {RATIO}
LYMPHOCYTES # BLD AUTO: 1.46 10*3/MM3 (ref 0.7–3.1)
LYMPHOCYTES NFR BLD AUTO: 36.8 % (ref 19.6–45.3)
MAGNESIUM SERPL-MCNC: 1.7 MG/DL (ref 1.6–2.6)
MCH RBC QN AUTO: 24.1 PG (ref 26.6–33)
MCHC RBC AUTO-ENTMCNC: 31.6 G/DL (ref 31.5–35.7)
MCV RBC AUTO: 76.1 FL (ref 79–97)
MICROALBUMIN/CREAT UR: NORMAL MG/G{CREAT}
MONOCYTES # BLD AUTO: 0.33 10*3/MM3 (ref 0.1–0.9)
MONOCYTES NFR BLD AUTO: 8.3 % (ref 5–12)
NEUTROPHILS NFR BLD AUTO: 1.88 10*3/MM3 (ref 1.7–7)
NEUTROPHILS NFR BLD AUTO: 47.3 % (ref 42.7–76)
NRBC BLD AUTO-RTO: 0 /100 WBC (ref 0–0.2)
PLATELET # BLD AUTO: 211 10*3/MM3 (ref 140–450)
PMV BLD AUTO: 11 FL (ref 6–12)
POTASSIUM SERPL-SCNC: 4.4 MMOL/L (ref 3.5–5.2)
PROT SERPL-MCNC: 6.8 G/DL (ref 6–8.5)
PSA SERPL-MCNC: 0.41 NG/ML (ref 0–4)
RBC # BLD AUTO: 5.11 10*6/MM3 (ref 4.14–5.8)
SODIUM SERPL-SCNC: 137 MMOL/L (ref 136–145)
TRIGL SERPL-MCNC: 97 MG/DL (ref 0–150)
TSH SERPL DL<=0.05 MIU/L-ACNC: 1.37 UIU/ML (ref 0.27–4.2)
URATE SERPL-MCNC: 7.7 MG/DL (ref 3.4–7)
VLDLC SERPL-MCNC: 18 MG/DL (ref 5–40)
WBC NRBC COR # BLD AUTO: 3.97 10*3/MM3 (ref 3.4–10.8)

## 2024-07-12 PROCEDURE — 83090 ASSAY OF HOMOCYSTEINE: CPT

## 2024-07-12 PROCEDURE — 82570 ASSAY OF URINE CREATININE: CPT

## 2024-07-12 PROCEDURE — 83735 ASSAY OF MAGNESIUM: CPT

## 2024-07-12 PROCEDURE — G0103 PSA SCREENING: HCPCS

## 2024-07-12 PROCEDURE — 99396 PREV VISIT EST AGE 40-64: CPT | Performed by: INTERNAL MEDICINE

## 2024-07-12 PROCEDURE — 85025 COMPLETE CBC W/AUTO DIFF WBC: CPT

## 2024-07-12 PROCEDURE — 84443 ASSAY THYROID STIM HORMONE: CPT

## 2024-07-12 PROCEDURE — 80053 COMPREHEN METABOLIC PANEL: CPT

## 2024-07-12 PROCEDURE — 82043 UR ALBUMIN QUANTITATIVE: CPT

## 2024-07-12 PROCEDURE — 93000 ELECTROCARDIOGRAM COMPLETE: CPT | Performed by: INTERNAL MEDICINE

## 2024-07-12 PROCEDURE — 84550 ASSAY OF BLOOD/URIC ACID: CPT

## 2024-07-12 PROCEDURE — 80061 LIPID PANEL: CPT

## 2024-07-12 NOTE — PROGRESS NOTES
"Chief Complaint   Patient presents with    Annual Exam     Labs complete       History of Present Illness  43 y.o. male presents for wellness exam     Review of Systems   Constitutional:  Negative for chills and fever.   Respiratory:  Negative for cough and shortness of breath.    Cardiovascular:  Negative for chest pain and palpitations.   Gastrointestinal:  Negative for abdominal pain, nausea and vomiting.   Musculoskeletal:         Intermittent neck pain   Skin:  Negative for rash.   Neurological:  Negative for dizziness, light-headedness and headaches.   Psychiatric/Behavioral:  Negative for decreased concentration and dysphoric mood.    All other systems reviewed and are negative.    .    PMSFH:  The following portions of the patient's history were reviewed and updated as appropriate: allergies, current medications, past family history, past medical history, past social history, past surgical history and problem list.      Current Outpatient Medications:     colchicine 0.6 MG tablet, Take 1 tablet by mouth Daily As Needed for Muscle / Joint Pain (gout)., Disp: 30 tablet, Rfl: 1    famotidine (PEPCID) 20 MG tablet, Take 1 tablet by mouth Daily., Disp: , Rfl:     lisinopril (PRINIVIL,ZESTRIL) 40 MG tablet, Take 1 tablet by mouth Daily., Disp: 30 tablet, Rfl: 11    VITALS:  /78   Pulse 78   Temp 97.1 °F (36.2 °C)   Ht 185.4 cm (72.99\")   Wt 104 kg (230 lb)   BMI 30.35 kg/m²     Physical Exam  Vitals and nursing note reviewed.   Constitutional:       Appearance: Normal appearance. He is well-developed.   HENT:      Head: Normocephalic.      Right Ear: Tympanic membrane and ear canal normal.      Left Ear: Tympanic membrane and ear canal normal.   Eyes:      Extraocular Movements: Extraocular movements intact.      Conjunctiva/sclera: Conjunctivae normal.   Neck:      Vascular: No carotid bruit.   Cardiovascular:      Rate and Rhythm: Normal rate and regular rhythm.   Pulmonary:      Effort: Pulmonary " effort is normal. No respiratory distress.      Breath sounds: Normal breath sounds.   Abdominal:      General: Bowel sounds are normal.      Palpations: Abdomen is soft.      Tenderness: There is no abdominal tenderness.   Genitourinary:     Testes: Normal.   Skin:     General: Skin is warm and dry.   Neurological:      General: No focal deficit present.      Mental Status: He is alert and oriented to person, place, and time.   Psychiatric:         Behavior: Behavior normal.         LABS:    CMP:  Lab Results   Component Value Date    BUN 11 07/12/2024    CREATININE 1.01 07/12/2024    EGFRIFNONA 97 05/26/2020    EGFRIFAFRI 108 05/14/2019     07/12/2024    K 4.4 07/12/2024     07/12/2024    CALCIUM 9.7 07/12/2024    PROTENTOTREF 7.0 05/14/2019    ALBUMIN 4.5 07/12/2024    LABGLOBREF 2.6 05/14/2019    BILITOT 0.4 07/12/2024    ALKPHOS 62 07/12/2024    AST 29 07/12/2024    ALT 26 07/12/2024     CBC w/ diff:   Lab Results   Component Value Date    WBC 3.97 07/12/2024    RBC 5.11 07/12/2024    HGB 12.3 (L) 07/12/2024    HCT 38.9 07/12/2024    MCV 76.1 (L) 07/12/2024    MCH 24.1 (L) 07/12/2024    MCHC 31.6 07/12/2024    RDW 16.2 (H) 07/12/2024     07/12/2024    NEUTRORELPCT 47.3 07/12/2024    AUTOIGPER 0.0 07/12/2024    LYMPHORELPCT 36.8 07/12/2024    MONORELPCT 8.3 07/12/2024    EOSRELPCT 6.8 (H) 07/12/2024    BASORELPCT 0.8 07/12/2024     LIPID PANEL:  Lab Results   Component Value Date    CHLPL 219 (H) 05/14/2019    TRIG 97 07/12/2024    HDL 37 (L) 07/12/2024    VLDL 18 07/12/2024     (H) 07/12/2024    LDLHDL 4.37 07/12/2024     TSH:  Lab Results   Component Value Date    TSH 1.370 07/12/2024       ECG 12 Lead    Date/Time: 7/12/2024 11:20 PM  Performed by: Yoan Woodard MD    Authorized by: Yoan Woodard MD  Comparison: compared with previous ECG from 7/11/2023  Comparison to previous ECG: Mildly improved   Rhythm: sinus rhythm  Rate: normal  Comments: Moderate intraventricular  block                ASSESSMENT/PLAN:  Diagnoses and all orders for this visit:    1. Preventative health care (Primary)    2. Hyperlipidemia LDL goal <100  Assessment & Plan:   Lipid abnormalities are stable    Plan:  Continue same medication/s without change.      Discussed medication dosage, use, side effects, and goals of treatment in detail.    Counseled patient on lifestyle modifications to help control hyperlipidemia.     Patient Treatment Goals:   .  LDL goal is under 100    Followup in 1 year.      3. Essential hypertension  Assessment & Plan:  Hypertension is stable and controlled  Continue current treatment regimen.  Dietary sodium restriction.  Weight loss.  Regular aerobic exercise.  Blood pressure will be reassessed in 1 year.    Orders:  -     ECG 12 Lead    4. Acute idiopathic gout involving toe of right foot  Assessment & Plan:  He has not had a spell in many months      5. Gastroesophageal reflux disease with esophagitis without hemorrhage  Assessment & Plan:  Rare symptoms and continue to modify diet           FOLLOW-UP:  Return in about 1 year (around 7/12/2025) for Annual physical.      Electronically signed by:    Yoan Woodard MD

## 2024-07-13 NOTE — ASSESSMENT & PLAN NOTE
Lipid abnormalities are stable    Plan:  Continue same medication/s without change.      Discussed medication dosage, use, side effects, and goals of treatment in detail.    Counseled patient on lifestyle modifications to help control hyperlipidemia.     Patient Treatment Goals:   .  LDL goal is under 100    Followup in 1 year.

## 2024-07-13 NOTE — ASSESSMENT & PLAN NOTE
Hypertension is stable and controlled  Continue current treatment regimen.  Dietary sodium restriction.  Weight loss.  Regular aerobic exercise.  Blood pressure will be reassessed in 1 year.

## 2024-07-17 DIAGNOSIS — I10 ESSENTIAL HYPERTENSION: ICD-10-CM

## 2024-07-17 RX ORDER — LISINOPRIL 40 MG/1
40 TABLET ORAL DAILY
Qty: 30 TABLET | Refills: 11 | Status: SHIPPED | OUTPATIENT
Start: 2024-07-17

## 2025-05-06 ENCOUNTER — TELEPHONE (OUTPATIENT)
Dept: INTERNAL MEDICINE | Facility: CLINIC | Age: 45
End: 2025-05-06
Payer: COMMERCIAL

## 2025-05-06 DIAGNOSIS — D64.9 ANEMIA, UNSPECIFIED TYPE: Primary | ICD-10-CM

## 2025-05-06 NOTE — TELEPHONE ENCOUNTER
"    Caller: Maldonado Escobedo \"RAZA\"    Relationship: Self    Best call back number: [phone]    What orders are you requesting (i.e. lab or imaging): ORDERS FOR A CBC       Additional notes: PATIENT STATES THAT HE IS APPLYING [employer] AND THEY DID BLOOD WORK FOR HIM BUT THEY ARE REQUESTING THAT HE GET ANOTHER CBC DONE BY HIS PRIMARY CARE DOCTOR THEY HAVE CONCERNS ABOUT ANEMIA           "

## 2025-05-06 NOTE — TELEPHONE ENCOUNTER
He gives blood and we have felt that is source of mild anemia. He has not given though for a few months so I reached out to him and he will do the labs this Friday

## 2025-05-09 ENCOUNTER — LAB (OUTPATIENT)
Dept: LAB | Facility: HOSPITAL | Age: 45
End: 2025-05-09
Payer: COMMERCIAL

## 2025-05-09 DIAGNOSIS — D64.9 ANEMIA, UNSPECIFIED TYPE: ICD-10-CM

## 2025-05-09 LAB
BASOPHILS # BLD AUTO: 0.05 10*3/MM3 (ref 0–0.2)
BASOPHILS NFR BLD AUTO: 1.1 % (ref 0–1.5)
DEPRECATED RDW RBC AUTO: 43.3 FL (ref 37–54)
EOSINOPHIL # BLD AUTO: 0.29 10*3/MM3 (ref 0–0.4)
EOSINOPHIL NFR BLD AUTO: 6.5 % (ref 0.3–6.2)
ERYTHROCYTE [DISTWIDTH] IN BLOOD BY AUTOMATED COUNT: 14.2 % (ref 12.3–15.4)
FERRITIN SERPL-MCNC: 29 NG/ML (ref 30–400)
FOLATE SERPL-MCNC: 17.9 NG/ML (ref 4.78–24.2)
HCT VFR BLD AUTO: 43.9 % (ref 37.5–51)
HGB BLD-MCNC: 14.4 G/DL (ref 13–17.7)
IMM GRANULOCYTES # BLD AUTO: 0.01 10*3/MM3 (ref 0–0.05)
IMM GRANULOCYTES NFR BLD AUTO: 0.2 % (ref 0–0.5)
IRON 24H UR-MRATE: 124 MCG/DL (ref 59–158)
IRON SATN MFR SERPL: 29 % (ref 20–50)
LYMPHOCYTES # BLD AUTO: 1.65 10*3/MM3 (ref 0.7–3.1)
LYMPHOCYTES NFR BLD AUTO: 36.7 % (ref 19.6–45.3)
MCH RBC QN AUTO: 27.7 PG (ref 26.6–33)
MCHC RBC AUTO-ENTMCNC: 32.8 G/DL (ref 31.5–35.7)
MCV RBC AUTO: 84.4 FL (ref 79–97)
MONOCYTES # BLD AUTO: 0.36 10*3/MM3 (ref 0.1–0.9)
MONOCYTES NFR BLD AUTO: 8 % (ref 5–12)
NEUTROPHILS NFR BLD AUTO: 2.13 10*3/MM3 (ref 1.7–7)
NEUTROPHILS NFR BLD AUTO: 47.5 % (ref 42.7–76)
NRBC BLD AUTO-RTO: 0 /100 WBC (ref 0–0.2)
PLATELET # BLD AUTO: 186 10*3/MM3 (ref 140–450)
PMV BLD AUTO: 11.2 FL (ref 6–12)
RBC # BLD AUTO: 5.2 10*6/MM3 (ref 4.14–5.8)
RETICS # AUTO: 0.07 10*6/MM3 (ref 0.02–0.13)
RETICS/RBC NFR AUTO: 1.31 % (ref 0.7–1.9)
TIBC SERPL-MCNC: 434 MCG/DL (ref 298–536)
TRANSFERRIN SERPL-MCNC: 291 MG/DL (ref 200–360)
VIT B12 BLD-MCNC: 327 PG/ML (ref 211–946)
WBC NRBC COR # BLD AUTO: 4.49 10*3/MM3 (ref 3.4–10.8)

## 2025-05-09 PROCEDURE — 85045 AUTOMATED RETICULOCYTE COUNT: CPT

## 2025-05-09 PROCEDURE — 85025 COMPLETE CBC W/AUTO DIFF WBC: CPT

## 2025-05-09 PROCEDURE — 82728 ASSAY OF FERRITIN: CPT

## 2025-05-09 PROCEDURE — 84466 ASSAY OF TRANSFERRIN: CPT

## 2025-05-09 PROCEDURE — 82607 VITAMIN B-12: CPT

## 2025-05-09 PROCEDURE — 82746 ASSAY OF FOLIC ACID SERUM: CPT

## 2025-05-09 PROCEDURE — 83540 ASSAY OF IRON: CPT

## 2025-05-11 ENCOUNTER — RESULTS FOLLOW-UP (OUTPATIENT)
Dept: INTERNAL MEDICINE | Facility: CLINIC | Age: 45
End: 2025-05-11
Payer: COMMERCIAL

## 2025-05-11 DIAGNOSIS — D50.9 IRON DEFICIENCY ANEMIA, UNSPECIFIED IRON DEFICIENCY ANEMIA TYPE: Primary | ICD-10-CM

## 2025-07-14 DIAGNOSIS — I10 ESSENTIAL HYPERTENSION: ICD-10-CM

## 2025-07-14 RX ORDER — LISINOPRIL 40 MG/1
40 TABLET ORAL DAILY
Qty: 30 TABLET | Refills: 0 | Status: SHIPPED | OUTPATIENT
Start: 2025-07-14

## 2025-07-14 NOTE — TELEPHONE ENCOUNTER
Rx Refill Note  Requested Prescriptions     Pending Prescriptions Disp Refills    lisinopril (PRINIVIL,ZESTRIL) 40 MG tablet [Pharmacy Med Name: LISINOPRIL 40 MG TABLET] 20 tablet      Sig: TAKE 1 TABLET BY MOUTH DAILY      Last office visit with prescribing clinician: 7/12/2024   Last telemedicine visit with prescribing clinician: Visit date not found   Next office visit with prescribing clinician: 7/25/2025                         Would you like a call back once the refill request has been completed: [] Yes [] No    If the office needs to give you a call back, can they leave a voicemail: [] Yes [] No    Kate Hopkins MA  07/14/25, 09:30 EDT

## 2025-07-25 ENCOUNTER — OFFICE VISIT (OUTPATIENT)
Dept: INTERNAL MEDICINE | Facility: CLINIC | Age: 45
End: 2025-07-25
Payer: COMMERCIAL

## 2025-07-25 ENCOUNTER — LAB (OUTPATIENT)
Dept: LAB | Facility: HOSPITAL | Age: 45
End: 2025-07-25
Payer: COMMERCIAL

## 2025-07-25 VITALS
OXYGEN SATURATION: 95 % | BODY MASS INDEX: 30.59 KG/M2 | DIASTOLIC BLOOD PRESSURE: 76 MMHG | HEART RATE: 97 BPM | HEIGHT: 73 IN | TEMPERATURE: 97.3 F | WEIGHT: 230.8 LBS | SYSTOLIC BLOOD PRESSURE: 112 MMHG

## 2025-07-25 DIAGNOSIS — R00.1 BRADYCARDIA BY ELECTROCARDIOGRAM: ICD-10-CM

## 2025-07-25 DIAGNOSIS — E78.5 HYPERLIPIDEMIA LDL GOAL <100: ICD-10-CM

## 2025-07-25 DIAGNOSIS — M10.071 ACUTE IDIOPATHIC GOUT INVOLVING TOE OF RIGHT FOOT: ICD-10-CM

## 2025-07-25 DIAGNOSIS — D50.9 IRON DEFICIENCY ANEMIA, UNSPECIFIED IRON DEFICIENCY ANEMIA TYPE: ICD-10-CM

## 2025-07-25 DIAGNOSIS — I10 ESSENTIAL HYPERTENSION: ICD-10-CM

## 2025-07-25 DIAGNOSIS — Z00.00 PREVENTATIVE HEALTH CARE: Primary | ICD-10-CM

## 2025-07-25 DIAGNOSIS — E66.09 CLASS 1 OBESITY DUE TO EXCESS CALORIES WITH SERIOUS COMORBIDITY AND BODY MASS INDEX (BMI) OF 30.0 TO 30.9 IN ADULT: ICD-10-CM

## 2025-07-25 DIAGNOSIS — E66.811 CLASS 1 OBESITY DUE TO EXCESS CALORIES WITH SERIOUS COMORBIDITY AND BODY MASS INDEX (BMI) OF 30.0 TO 30.9 IN ADULT: ICD-10-CM

## 2025-07-25 DIAGNOSIS — Z12.5 SCREENING PSA (PROSTATE SPECIFIC ANTIGEN): ICD-10-CM

## 2025-07-25 LAB
ALBUMIN SERPL-MCNC: 4.6 G/DL (ref 3.5–5.2)
ALBUMIN/GLOB SERPL: 1.8 G/DL
ALP SERPL-CCNC: 58 U/L (ref 39–117)
ALT SERPL W P-5'-P-CCNC: 20 U/L (ref 1–41)
ANION GAP SERPL CALCULATED.3IONS-SCNC: 12 MMOL/L (ref 5–15)
AST SERPL-CCNC: 28 U/L (ref 1–40)
BASOPHILS # BLD AUTO: 0.04 10*3/MM3 (ref 0–0.2)
BASOPHILS NFR BLD AUTO: 0.8 % (ref 0–1.5)
BILIRUB SERPL-MCNC: 0.5 MG/DL (ref 0–1.2)
BUN SERPL-MCNC: 14 MG/DL (ref 6–20)
BUN/CREAT SERPL: 13.2 (ref 7–25)
CALCIUM SPEC-SCNC: 9.6 MG/DL (ref 8.6–10.5)
CHLORIDE SERPL-SCNC: 101 MMOL/L (ref 98–107)
CHOLEST SERPL-MCNC: 209 MG/DL (ref 0–200)
CO2 SERPL-SCNC: 25 MMOL/L (ref 22–29)
CREAT SERPL-MCNC: 1.06 MG/DL (ref 0.76–1.27)
DEPRECATED RDW RBC AUTO: 42.2 FL (ref 37–54)
EGFRCR SERPLBLD CKD-EPI 2021: 89.3 ML/MIN/1.73
EOSINOPHIL # BLD AUTO: 0.36 10*3/MM3 (ref 0–0.4)
EOSINOPHIL NFR BLD AUTO: 7.1 % (ref 0.3–6.2)
ERYTHROCYTE [DISTWIDTH] IN BLOOD BY AUTOMATED COUNT: 13 % (ref 12.3–15.4)
FERRITIN SERPL-MCNC: 44.9 NG/ML (ref 30–400)
GLOBULIN UR ELPH-MCNC: 2.5 GM/DL
GLUCOSE SERPL-MCNC: 91 MG/DL (ref 65–99)
HCT VFR BLD AUTO: 46 % (ref 37.5–51)
HDLC SERPL-MCNC: 37 MG/DL (ref 40–60)
HGB BLD-MCNC: 15.4 G/DL (ref 13–17.7)
IMM GRANULOCYTES # BLD AUTO: 0.01 10*3/MM3 (ref 0–0.05)
IMM GRANULOCYTES NFR BLD AUTO: 0.2 % (ref 0–0.5)
IRON 24H UR-MRATE: 101 MCG/DL (ref 59–158)
IRON SATN MFR SERPL: 23 % (ref 20–50)
LDLC SERPL CALC-MCNC: 158 MG/DL (ref 0–100)
LDLC/HDLC SERPL: 4.23 {RATIO}
LYMPHOCYTES # BLD AUTO: 1.81 10*3/MM3 (ref 0.7–3.1)
LYMPHOCYTES NFR BLD AUTO: 35.5 % (ref 19.6–45.3)
MAGNESIUM SERPL-MCNC: 1.8 MG/DL (ref 1.6–2.6)
MCH RBC QN AUTO: 29.7 PG (ref 26.6–33)
MCHC RBC AUTO-ENTMCNC: 33.5 G/DL (ref 31.5–35.7)
MCV RBC AUTO: 88.6 FL (ref 79–97)
MONOCYTES # BLD AUTO: 0.39 10*3/MM3 (ref 0.1–0.9)
MONOCYTES NFR BLD AUTO: 7.6 % (ref 5–12)
NEUTROPHILS NFR BLD AUTO: 2.49 10*3/MM3 (ref 1.7–7)
NEUTROPHILS NFR BLD AUTO: 48.8 % (ref 42.7–76)
NRBC BLD AUTO-RTO: 0 /100 WBC (ref 0–0.2)
PLATELET # BLD AUTO: 191 10*3/MM3 (ref 140–450)
PMV BLD AUTO: 11.3 FL (ref 6–12)
POTASSIUM SERPL-SCNC: 4.2 MMOL/L (ref 3.5–5.2)
PROT SERPL-MCNC: 7.1 G/DL (ref 6–8.5)
PSA SERPL-MCNC: 0.47 NG/ML (ref 0–4)
RBC # BLD AUTO: 5.19 10*6/MM3 (ref 4.14–5.8)
SODIUM SERPL-SCNC: 138 MMOL/L (ref 136–145)
TIBC SERPL-MCNC: 443 MCG/DL (ref 298–536)
TRANSFERRIN SERPL-MCNC: 297 MG/DL (ref 200–360)
TRIGL SERPL-MCNC: 77 MG/DL (ref 0–150)
TSH SERPL DL<=0.05 MIU/L-ACNC: 1.45 UIU/ML (ref 0.27–4.2)
URATE SERPL-MCNC: 7.8 MG/DL (ref 3.4–7)
VLDLC SERPL-MCNC: 14 MG/DL (ref 5–40)
WBC NRBC COR # BLD AUTO: 5.1 10*3/MM3 (ref 3.4–10.8)

## 2025-07-25 PROCEDURE — 84466 ASSAY OF TRANSFERRIN: CPT

## 2025-07-25 PROCEDURE — 84443 ASSAY THYROID STIM HORMONE: CPT

## 2025-07-25 PROCEDURE — 80053 COMPREHEN METABOLIC PANEL: CPT

## 2025-07-25 PROCEDURE — 93000 ELECTROCARDIOGRAM COMPLETE: CPT | Performed by: INTERNAL MEDICINE

## 2025-07-25 PROCEDURE — 80061 LIPID PANEL: CPT

## 2025-07-25 PROCEDURE — G0103 PSA SCREENING: HCPCS

## 2025-07-25 PROCEDURE — 83735 ASSAY OF MAGNESIUM: CPT

## 2025-07-25 PROCEDURE — 82728 ASSAY OF FERRITIN: CPT

## 2025-07-25 PROCEDURE — 84550 ASSAY OF BLOOD/URIC ACID: CPT

## 2025-07-25 PROCEDURE — 82043 UR ALBUMIN QUANTITATIVE: CPT

## 2025-07-25 PROCEDURE — 82570 ASSAY OF URINE CREATININE: CPT

## 2025-07-25 PROCEDURE — 83540 ASSAY OF IRON: CPT

## 2025-07-25 PROCEDURE — 99396 PREV VISIT EST AGE 40-64: CPT | Performed by: INTERNAL MEDICINE

## 2025-07-25 PROCEDURE — 85025 COMPLETE CBC W/AUTO DIFF WBC: CPT

## 2025-07-25 NOTE — ASSESSMENT & PLAN NOTE
Patient's (Body mass index is 30.46 kg/m².) indicates that they are obese (BMI >30) with health conditions that include hypertension, dyslipidemias, GERD, and osteoarthritis . Weight is unchanged. BMI  is above average; BMI management plan is completed. We discussed portion control and increasing exercise.

## 2025-07-25 NOTE — PROGRESS NOTES
"Chief Complaint   Patient presents with    Annual Exam    Hypertension    fasting       History of Present Illness  44 y.o. male presents for wellness exam     Review of Systems   Constitutional:  Negative for chills and fever.   Respiratory:  Negative for cough and shortness of breath.    Cardiovascular:  Negative for chest pain and palpitations.   Gastrointestinal:  Negative for abdominal pain, blood in stool, nausea and vomiting.   Skin:  Negative for rash.   Neurological:  Negative for dizziness, light-headedness and headaches.   Psychiatric/Behavioral:  Negative for decreased concentration and dysphoric mood.    All other systems reviewed and are negative.    .    PMSFH:  The following portions of the patient's history were reviewed and updated as appropriate: allergies, current medications, past family history, past medical history, past social history, past surgical history and problem list.      Current Outpatient Medications:     colchicine 0.6 MG tablet, Take 1 tablet by mouth Daily As Needed for Muscle / Joint Pain (gout)., Disp: 30 tablet, Rfl: 1    famotidine (PEPCID) 20 MG tablet, Take 1 tablet by mouth Daily., Disp: , Rfl:     lisinopril (PRINIVIL,ZESTRIL) 40 MG tablet, TAKE 1 TABLET BY MOUTH DAILY, Disp: 30 tablet, Rfl: 0    VITALS:  /76 (BP Location: Left arm, Patient Position: Sitting, Cuff Size: Adult)   Pulse 97   Temp 97.3 °F (36.3 °C) (Infrared)   Ht 185.4 cm (72.99\")   Wt 105 kg (230 lb 12.8 oz)   SpO2 95%   BMI 30.46 kg/m²     Physical Exam  Vitals and nursing note reviewed.   Constitutional:       Appearance: Normal appearance. He is well-developed.   HENT:      Head: Normocephalic.      Right Ear: Tympanic membrane and ear canal normal.      Left Ear: Tympanic membrane and ear canal normal.   Eyes:      Extraocular Movements: Extraocular movements intact.      Conjunctiva/sclera: Conjunctivae normal.   Cardiovascular:      Rate and Rhythm: Normal rate and regular rhythm.      " "Heart sounds: Normal heart sounds.   Pulmonary:      Effort: Pulmonary effort is normal. No respiratory distress.      Breath sounds: Normal breath sounds.   Abdominal:      General: Bowel sounds are normal.      Palpations: Abdomen is soft.      Tenderness: There is no abdominal tenderness.   Genitourinary:     Testes: Normal.   Skin:     General: Skin is warm and dry.   Neurological:      General: No focal deficit present.      Mental Status: He is alert and oriented to person, place, and time.   Psychiatric:         Mood and Affect: Mood normal.         Behavior: Behavior normal.         LABS:    CMP:  Lab Results   Component Value Date    BUN 11 07/12/2024    CREATININE 1.01 07/12/2024    EGFRIFNONA 97 05/26/2020    EGFRIFAFRI 108 05/14/2019     07/12/2024    K 4.4 07/12/2024     07/12/2024    CALCIUM 9.7 07/12/2024    ALBUMIN 4.5 07/12/2024    BILITOT 0.4 07/12/2024    ALKPHOS 62 07/12/2024    AST 29 07/12/2024    ALT 26 07/12/2024     CBC:  Lab Results   Component Value Date    WBC 4.49 05/09/2025    RBC 5.20 05/09/2025    HGB 14.4 05/09/2025    HCT 43.9 05/09/2025    MCV 84.4 05/09/2025    MCH 27.7 05/09/2025    MCHC 32.8 05/09/2025    RDW 14.2 05/09/2025     05/09/2025     LIPID PANEL:  Lab Results   Component Value Date    CHLPL 219 (H) 05/14/2019    TRIG 97 07/12/2024    HDL 37 (L) 07/12/2024    VLDL 18 07/12/2024     (H) 07/12/2024    LDLHDL 4.37 07/12/2024     TSH:  Lab Results   Component Value Date    TSH 1.370 07/12/2024     HGBA1C (LAST 3):No results found for: \"HGBA1C\"    ECG 12 Lead    Date/Time: 7/25/2025 3:12 PM  Performed by: Yoan Woodard MD    Authorized by: Yoan Woodard MD  Comparison: compared with previous ECG from 7/12/2024  Similar to previous ECG  Comparison to previous ECG: Similar to it and 2023   Rhythm: sinus bradycardia  Rate: bradycardic  Conduction: incomplete right bundle branch block  QRS axis: normal               ASSESSMENT/PLAN:  Diagnoses " and all orders for this visit:    1. Preventative health care (Primary)  Assessment & Plan:  He has had recent good eye exam and ok and hu=is mood is good overall. He gives blood and has been iron deficient from it but denies bleeding. Age-appropriate Counseling:  Discussed preventative medicine issues with patient including regular exercise, healthy diet, stress reduction, adequate sleep and recommended age-appropriate screening studies.  Immunizations reviewed.          2. Essential hypertension  Assessment & Plan:  Hypertension is stable and controlled  Continue current treatment regimen.  Dietary sodium restriction.  Weight loss.  Regular aerobic exercise.  Blood pressure will be reassessed in 1 year.      3. Hyperlipidemia LDL goal <100  Assessment & Plan:    Increase fiber and vegetables and limit processed foods and bad carbs     Orders:  -     Comprehensive Metabolic Panel; Future  -     Lipid Panel; Future  -     Microalbumin / Creatinine Urine Ratio - Urine, Clean Catch; Future  -     TSH Rfx On Abnormal To Free T4; Future    4. Acute idiopathic gout involving toe of right foot  Assessment & Plan:  Doing well and follow labs     Orders:  -     Uric Acid; Future    5. Screening PSA (prostate specific antigen)  -     PSA Screen; Future    6. Bradycardia by electrocardiogram  Assessment & Plan:  Stable and follow labs     Orders:  -     Magnesium; Future    7. Class 1 obesity due to excess calories with serious comorbidity and body mass index (BMI) of 30.0 to 30.9 in adult  Assessment & Plan:  Patient's (Body mass index is 30.46 kg/m².) indicates that they are obese (BMI >30) with health conditions that include hypertension, dyslipidemias, GERD, and osteoarthritis . Weight is unchanged. BMI  is above average; BMI management plan is completed. We discussed portion control and increasing exercise.       Other orders  -     ECG 12 Lead        FOLLOW-UP:  Return in about 1 year (around 7/25/2026) for Annual  physical.      Electronically signed by:    Yoan Woodard MD

## 2025-07-25 NOTE — ASSESSMENT & PLAN NOTE
He has had recent good eye exam and ok and hu=is mood is good overall. He gives blood and has been iron deficient from it but denies bleeding. Age-appropriate Counseling:  Discussed preventative medicine issues with patient including regular exercise, healthy diet, stress reduction, adequate sleep and recommended age-appropriate screening studies.  Immunizations reviewed.

## 2025-07-26 ENCOUNTER — RESULTS FOLLOW-UP (OUTPATIENT)
Dept: LAB | Facility: HOSPITAL | Age: 45
End: 2025-07-26
Payer: COMMERCIAL

## 2025-07-26 LAB
ALBUMIN UR-MCNC: <1.2 MG/DL
CREAT UR-MCNC: 134.4 MG/DL
MICROALBUMIN/CREAT UR: NORMAL MG/G{CREAT}

## 2025-08-13 DIAGNOSIS — I10 ESSENTIAL HYPERTENSION: ICD-10-CM

## 2025-08-13 RX ORDER — LISINOPRIL 40 MG/1
40 TABLET ORAL DAILY
Qty: 30 TABLET | Refills: 11 | Status: SHIPPED | OUTPATIENT
Start: 2025-08-13